# Patient Record
Sex: FEMALE | Race: WHITE | Employment: OTHER
[De-identification: names, ages, dates, MRNs, and addresses within clinical notes are randomized per-mention and may not be internally consistent; named-entity substitution may affect disease eponyms.]

---

## 2017-02-09 ENCOUNTER — TELEPHONE (OUTPATIENT)
Dept: INTERNAL MEDICINE | Facility: CLINIC | Age: 62
End: 2017-02-09

## 2017-03-16 ENCOUNTER — HOSPITAL ENCOUNTER (OUTPATIENT)
Age: 62
Setting detail: SPECIMEN
Discharge: HOME | End: 2017-03-16
Payer: COMMERCIAL

## 2017-03-16 ENCOUNTER — OFFICE VISIT (OUTPATIENT)
Dept: INTERNAL MEDICINE CLINIC | Age: 62
End: 2017-03-16
Payer: COMMERCIAL

## 2017-03-16 VITALS
WEIGHT: 165 LBS | DIASTOLIC BLOOD PRESSURE: 60 MMHG | SYSTOLIC BLOOD PRESSURE: 108 MMHG | BODY MASS INDEX: 25.9 KG/M2 | TEMPERATURE: 98.2 F | HEART RATE: 68 BPM | RESPIRATION RATE: 16 BRPM

## 2017-03-16 DIAGNOSIS — Z12.4 PAP SMEAR FOR CERVICAL CANCER SCREENING: Primary | ICD-10-CM

## 2017-03-16 DIAGNOSIS — T78.40XD ALLERGY, SUBSEQUENT ENCOUNTER: ICD-10-CM

## 2017-03-16 DIAGNOSIS — N89.8 VAGINAL DRYNESS: ICD-10-CM

## 2017-03-16 PROCEDURE — 99214 OFFICE O/P EST MOD 30 MIN: CPT | Performed by: INTERNAL MEDICINE

## 2017-03-16 RX ORDER — FLUTICASONE PROPIONATE 50 MCG
1 SPRAY, SUSPENSION (ML) NASAL DAILY
Qty: 3 BOTTLE | Refills: 1 | Status: SHIPPED | OUTPATIENT
Start: 2017-03-16 | End: 2017-11-21 | Stop reason: SDUPTHER

## 2017-03-20 LAB — CYTOLOGY REPORT: NORMAL

## 2017-09-21 ENCOUNTER — OFFICE VISIT (OUTPATIENT)
Dept: INTERNAL MEDICINE CLINIC | Age: 62
End: 2017-09-21
Payer: COMMERCIAL

## 2017-09-21 VITALS
BODY MASS INDEX: 25.43 KG/M2 | RESPIRATION RATE: 20 BRPM | HEART RATE: 80 BPM | SYSTOLIC BLOOD PRESSURE: 100 MMHG | HEIGHT: 67 IN | WEIGHT: 162 LBS | DIASTOLIC BLOOD PRESSURE: 60 MMHG | TEMPERATURE: 98.3 F

## 2017-09-21 DIAGNOSIS — E78.00 HIGH CHOLESTEROL: ICD-10-CM

## 2017-09-21 DIAGNOSIS — Z91.09 ENVIRONMENTAL ALLERGIES: Primary | ICD-10-CM

## 2017-09-21 PROCEDURE — 99213 OFFICE O/P EST LOW 20 MIN: CPT | Performed by: INTERNAL MEDICINE

## 2017-09-21 ASSESSMENT — PATIENT HEALTH QUESTIONNAIRE - PHQ9
SUM OF ALL RESPONSES TO PHQ9 QUESTIONS 1 & 2: 0
2. FEELING DOWN, DEPRESSED OR HOPELESS: 0
1. LITTLE INTEREST OR PLEASURE IN DOING THINGS: 0
SUM OF ALL RESPONSES TO PHQ QUESTIONS 1-9: 0

## 2017-11-21 RX ORDER — FLUTICASONE PROPIONATE 50 MCG
SPRAY, SUSPENSION (ML) NASAL
Qty: 1 BOTTLE | Refills: 5 | Status: SHIPPED | OUTPATIENT
Start: 2017-11-21 | End: 2020-01-15 | Stop reason: SDUPTHER

## 2018-01-03 ENCOUNTER — APPOINTMENT (OUTPATIENT)
Dept: NUCLEAR MEDICINE | Age: 63
End: 2018-01-03
Payer: COMMERCIAL

## 2018-01-03 ENCOUNTER — APPOINTMENT (OUTPATIENT)
Dept: GENERAL RADIOLOGY | Age: 63
End: 2018-01-03
Payer: COMMERCIAL

## 2018-01-03 ENCOUNTER — HOSPITAL ENCOUNTER (OUTPATIENT)
Age: 63
Setting detail: OBSERVATION
Discharge: HOME | End: 2018-01-03
Attending: EMERGENCY MEDICINE | Admitting: INTERNAL MEDICINE
Payer: COMMERCIAL

## 2018-01-03 VITALS
TEMPERATURE: 98.4 F | RESPIRATION RATE: 20 BRPM | SYSTOLIC BLOOD PRESSURE: 107 MMHG | OXYGEN SATURATION: 97 % | HEIGHT: 67 IN | HEART RATE: 75 BPM | BODY MASS INDEX: 25.11 KG/M2 | DIASTOLIC BLOOD PRESSURE: 66 MMHG | WEIGHT: 160 LBS

## 2018-01-03 DIAGNOSIS — R07.9 CHEST PAIN, UNSPECIFIED TYPE: Primary | ICD-10-CM

## 2018-01-03 LAB
ABSOLUTE EOS #: 0.4 K/UL (ref 0–0.4)
ABSOLUTE IMMATURE GRANULOCYTE: ABNORMAL K/UL (ref 0–0.3)
ABSOLUTE LYMPH #: 2.9 K/UL (ref 1–4.8)
ABSOLUTE MONO #: 0.6 K/UL (ref 0.2–0.8)
ANION GAP SERPL CALCULATED.3IONS-SCNC: 16 MMOL/L (ref 9–17)
BASOPHILS # BLD: 2 % (ref 0–2)
BASOPHILS ABSOLUTE: 0.2 K/UL (ref 0–0.2)
BUN BLDV-MCNC: 11 MG/DL (ref 8–23)
BUN/CREAT BLD: 17 (ref 9–20)
CALCIUM SERPL-MCNC: 9.3 MG/DL (ref 8.6–10.4)
CHLORIDE BLD-SCNC: 97 MMOL/L (ref 98–107)
CO2: 24 MMOL/L (ref 20–31)
CREAT SERPL-MCNC: 0.64 MG/DL (ref 0.5–0.9)
D-DIMER QUANTITATIVE: 0.47 MG/L FEU
DIFFERENTIAL TYPE: ABNORMAL
EKG ATRIAL RATE: 64 BPM
EKG ATRIAL RATE: 78 BPM
EKG P AXIS: 64 DEGREES
EKG P AXIS: 73 DEGREES
EKG P-R INTERVAL: 180 MS
EKG P-R INTERVAL: 204 MS
EKG Q-T INTERVAL: 418 MS
EKG Q-T INTERVAL: 452 MS
EKG QRS DURATION: 86 MS
EKG QRS DURATION: 88 MS
EKG QTC CALCULATION (BAZETT): 466 MS
EKG QTC CALCULATION (BAZETT): 476 MS
EKG R AXIS: -5 DEGREES
EKG R AXIS: -9 DEGREES
EKG T AXIS: 52 DEGREES
EKG T AXIS: 69 DEGREES
EKG VENTRICULAR RATE: 64 BPM
EKG VENTRICULAR RATE: 78 BPM
EOSINOPHILS RELATIVE PERCENT: 5 % (ref 1–4)
GFR AFRICAN AMERICAN: >60 ML/MIN
GFR NON-AFRICAN AMERICAN: >60 ML/MIN
GFR SERPL CREATININE-BSD FRML MDRD: ABNORMAL ML/MIN/{1.73_M2}
GFR SERPL CREATININE-BSD FRML MDRD: ABNORMAL ML/MIN/{1.73_M2}
GLUCOSE BLD-MCNC: 102 MG/DL (ref 70–99)
HCT VFR BLD CALC: 43.3 % (ref 36–46)
HEMOGLOBIN: 13.9 G/DL (ref 12–16)
IMMATURE GRANULOCYTES: ABNORMAL %
INR BLD: 0.9
LV EF: 77 %
LVEF MODALITY: NORMAL
LYMPHOCYTES # BLD: 37 % (ref 24–44)
MCH RBC QN AUTO: 30.3 PG (ref 26–34)
MCHC RBC AUTO-ENTMCNC: 32.2 G/DL (ref 31–37)
MCV RBC AUTO: 94.3 FL (ref 80–100)
MONOCYTES # BLD: 7 % (ref 1–7)
MYOGLOBIN: 22 NG/ML (ref 25–58)
MYOGLOBIN: <21 NG/ML (ref 25–58)
PARTIAL THROMBOPLASTIN TIME: 25.4 SEC (ref 23–31)
PDW BLD-RTO: 12.9 % (ref 11.5–14.5)
PLATELET # BLD: 332 K/UL (ref 130–400)
PLATELET ESTIMATE: ABNORMAL
PMV BLD AUTO: ABNORMAL FL (ref 6–12)
POTASSIUM SERPL-SCNC: 3.8 MMOL/L (ref 3.7–5.3)
PROTHROMBIN TIME: 9.7 SEC (ref 9.7–11.6)
RBC # BLD: 4.59 M/UL (ref 4–5.2)
RBC # BLD: ABNORMAL 10*6/UL
SEG NEUTROPHILS: 49 % (ref 36–66)
SEGMENTED NEUTROPHILS ABSOLUTE COUNT: 3.8 K/UL (ref 1.8–7.7)
SODIUM BLD-SCNC: 137 MMOL/L (ref 135–144)
TROPONIN INTERP: ABNORMAL
TROPONIN T: <0.03 NG/ML
WBC # BLD: 7.9 K/UL (ref 3.5–11)
WBC # BLD: ABNORMAL 10*3/UL

## 2018-01-03 PROCEDURE — A9500 TC99M SESTAMIBI: HCPCS | Performed by: EMERGENCY MEDICINE

## 2018-01-03 PROCEDURE — 6370000000 HC RX 637 (ALT 250 FOR IP): Performed by: EMERGENCY MEDICINE

## 2018-01-03 PROCEDURE — 71045 X-RAY EXAM CHEST 1 VIEW: CPT

## 2018-01-03 PROCEDURE — G0378 HOSPITAL OBSERVATION PER HR: HCPCS

## 2018-01-03 PROCEDURE — 93005 ELECTROCARDIOGRAM TRACING: CPT

## 2018-01-03 PROCEDURE — 3430000000 HC RX DIAGNOSTIC RADIOPHARMACEUTICAL: Performed by: EMERGENCY MEDICINE

## 2018-01-03 PROCEDURE — 84484 ASSAY OF TROPONIN QUANT: CPT

## 2018-01-03 PROCEDURE — 78452 HT MUSCLE IMAGE SPECT MULT: CPT

## 2018-01-03 PROCEDURE — 93017 CV STRESS TEST TRACING ONLY: CPT

## 2018-01-03 PROCEDURE — 81003 URINALYSIS AUTO W/O SCOPE: CPT

## 2018-01-03 PROCEDURE — 85610 PROTHROMBIN TIME: CPT

## 2018-01-03 PROCEDURE — 36415 COLL VENOUS BLD VENIPUNCTURE: CPT

## 2018-01-03 PROCEDURE — 85025 COMPLETE CBC W/AUTO DIFF WBC: CPT

## 2018-01-03 PROCEDURE — 85730 THROMBOPLASTIN TIME PARTIAL: CPT

## 2018-01-03 PROCEDURE — 85379 FIBRIN DEGRADATION QUANT: CPT

## 2018-01-03 PROCEDURE — 99223 1ST HOSP IP/OBS HIGH 75: CPT | Performed by: INTERNAL MEDICINE

## 2018-01-03 PROCEDURE — 83874 ASSAY OF MYOGLOBIN: CPT

## 2018-01-03 PROCEDURE — 99285 EMERGENCY DEPT VISIT HI MDM: CPT

## 2018-01-03 PROCEDURE — 80048 BASIC METABOLIC PNL TOTAL CA: CPT

## 2018-01-03 RX ORDER — SODIUM CHLORIDE 0.9 % (FLUSH) 0.9 %
10 SYRINGE (ML) INJECTION PRN
Status: CANCELLED | OUTPATIENT
Start: 2018-01-03 | End: 2018-01-04

## 2018-01-03 RX ORDER — NITROGLYCERIN 0.4 MG/1
0.4 TABLET SUBLINGUAL EVERY 5 MIN PRN
Status: CANCELLED | OUTPATIENT
Start: 2018-01-03 | End: 2018-01-04

## 2018-01-03 RX ORDER — 0.9 % SODIUM CHLORIDE 0.9 %
250 INTRAVENOUS SOLUTION INTRAVENOUS ONCE
Status: DISCONTINUED | OUTPATIENT
Start: 2018-01-03 | End: 2018-01-03 | Stop reason: HOSPADM

## 2018-01-03 RX ORDER — SODIUM CHLORIDE 0.9 % (FLUSH) 0.9 %
10 SYRINGE (ML) INJECTION PRN
Status: DISCONTINUED | OUTPATIENT
Start: 2018-01-03 | End: 2018-01-03 | Stop reason: HOSPADM

## 2018-01-03 RX ORDER — METOPROLOL TARTRATE 5 MG/5ML
2.5 INJECTION INTRAVENOUS PRN
Status: DISCONTINUED | OUTPATIENT
Start: 2018-01-03 | End: 2018-01-03 | Stop reason: HOSPADM

## 2018-01-03 RX ORDER — NITROGLYCERIN 0.4 MG/1
0.4 TABLET SUBLINGUAL EVERY 5 MIN PRN
Status: DISCONTINUED | OUTPATIENT
Start: 2018-01-03 | End: 2018-01-03 | Stop reason: HOSPADM

## 2018-01-03 RX ORDER — SODIUM CHLORIDE 0.9 % (FLUSH) 0.9 %
10 SYRINGE (ML) INJECTION EVERY 12 HOURS SCHEDULED
Status: DISCONTINUED | OUTPATIENT
Start: 2018-01-03 | End: 2018-01-03 | Stop reason: HOSPADM

## 2018-01-03 RX ORDER — 0.9 % SODIUM CHLORIDE 0.9 %
250 INTRAVENOUS SOLUTION INTRAVENOUS ONCE
Status: CANCELLED | OUTPATIENT
Start: 2018-01-03 | End: 2018-01-03

## 2018-01-03 RX ORDER — METOPROLOL TARTRATE 5 MG/5ML
2.5 INJECTION INTRAVENOUS PRN
Status: CANCELLED | OUTPATIENT
Start: 2018-01-03 | End: 2018-01-04

## 2018-01-03 RX ORDER — AMINOPHYLLINE DIHYDRATE 25 MG/ML
100 INJECTION, SOLUTION INTRAVENOUS
Status: DISCONTINUED | OUTPATIENT
Start: 2018-01-03 | End: 2018-01-03 | Stop reason: HOSPADM

## 2018-01-03 RX ORDER — ASPIRIN 81 MG/1
324 TABLET, CHEWABLE ORAL ONCE
Status: COMPLETED | OUTPATIENT
Start: 2018-01-03 | End: 2018-01-03

## 2018-01-03 RX ADMIN — TETRAKIS(2-METHOXYISOBUTYLISOCYANIDE)COPPER(I) TETRAFLUOROBORATE 18.4 MILLICURIE: 1 INJECTION, POWDER, LYOPHILIZED, FOR SOLUTION INTRAVENOUS at 09:10

## 2018-01-03 RX ADMIN — ASPIRIN 81 MG 324 MG: 81 TABLET ORAL at 01:30

## 2018-01-03 RX ADMIN — TETRAKIS(2-METHOXYISOBUTYLISOCYANIDE)COPPER(I) TETRAFLUOROBORATE 40.4 MILLICURIE: 1 INJECTION, POWDER, LYOPHILIZED, FOR SOLUTION INTRAVENOUS at 12:55

## 2018-01-03 ASSESSMENT — PAIN SCALES - GENERAL
PAINLEVEL_OUTOF10: 2
PAINLEVEL_OUTOF10: 3
PAINLEVEL_OUTOF10: 0

## 2018-01-03 ASSESSMENT — ENCOUNTER SYMPTOMS
FACIAL SWELLING: 0
EYE DISCHARGE: 0
DIARRHEA: 0
EYE REDNESS: 0
ABDOMINAL PAIN: 0
COLOR CHANGE: 0
VOMITING: 0
SHORTNESS OF BREATH: 0
CONSTIPATION: 0
COUGH: 0

## 2018-01-03 ASSESSMENT — PAIN DESCRIPTION - LOCATION: LOCATION: CHEST

## 2018-01-03 NOTE — H&P
[id number] [facility] [address]                 [address] [place] [address]                               HISTORY AND PHYSICAL    PATIENT NAME: Ahsan Monson                 :        1955  MED REC NO:   [id number]                             ROOM:       9112  ACCOUNT NO:   [de-identified]                           ADMIT DATE: 2018  PROVIDER:     Ignacio Hilliard    CHIEF COMPLAINT:  Chest pain, back pain. HISTORY OF PRESENT ILLNESS:  This is a 46-year-old white female who  regularly exercises and is in good health, presented to the emergency room  with chest pain. The patient states that when she was at home, sitting at  around 10:30 p.m., started having sudden back pain and then started having  some chest tightness and pain. The patient's pain did not improve and so  presented to the emergency room and nitro was given and patient's symptoms  slightly improve. The patient was admitted for further evaluation and to  rule out coronary artery disease. The patient is currently stable. Denies  any chest pain. The patient had first part of the stress test done without  any significant difficulty. PAST MEDICAL HISTORY:  Positive for asthma, irritable bowel syndrome, panic  disorder, and allergies. DRUG ALLERGIES:  The patient is allergic to PENICILLIN and SULFA. MEDICATIONS:  The patient is on Zyrtec, Flonase, and QVAR. SOCIAL HISTORY:  The patient does not smoke or drink alcohol. FAMILY HISTORY:  Noncontributory. REVIEW OF SYSTEMS:  Denies any chest pain at this time, but had chest pain  and tightness and back pain. The patient denies any shortness of breath,  cough, or wheezing. No flu symptoms. No fever, chills, nausea, vomiting,  diarrhea, or constipation. No blurred vision or double vision. All other  review of systems was negative.     PHYSICAL EXAMINATION:  GENERAL:  A 46-year-old white female lying in bed, not in any acute  distress. VITAL SIGNS:  Stable. Afebrile. HEENT:  Atraumatic, normocephalic. Throat clear. NECK:  No bruits, no JVD. HEART:  S1, S2 heard of normal intensity. LUNGS:  Clear to auscultation and percussion. Unlabored breathing. ABDOMEN:  Soft, nontender. Bowel sounds are heard. EXTREMITIES:  No edema. No calf tenderness. ASSESSMENT:  Chest pain, back pain, history of asthma, history of  allergies, and history of panic attacks. PLAN:  Continue current treatment. Cardiology evaluation, stress test to  rule out coronary artery disease, supportive care, and D-dimer ordered. Activity as tolerated if stress test is negative. The patient will be  discharged to home today. Discussed with the patient and staff.         Betty Pagan    D: 01/03/2018 12:04:58       T: 01/03/2018 13:38:26     [email or web address]  Job#: [id number]     Doc#: [id number]    CC:

## 2018-01-03 NOTE — ED NOTES
Report called to PCU pt to go to stress test then to 1002 POC discussed with pt.      Mabeline Lefort, RN  01/03/18 0579

## 2018-01-03 NOTE — CONSULTS
Cardiovascular Consult Note     TODAY'S DATE: 1/3/2018    Patient name: Brian Alexandre   YOB: 1955  Date of admission:  1/3/2018       Patient seen, examined. Previous clinical entries reviewed. All available laboratory, imaging and ancillary data reviewed. Reason for Consult:  Chest pain. Referring Physician: Dr. Angela Johnson MD.    History of present Illness:     Brian Alexandre is a 65 y.o. female with past medical history significant for asthma, panic disorder and irritable bowel syndrome who presented to the emergency room with complaints of chest pain that started the middle of the chest while she was watching television. She had some diaphoresis. It was a mild to moderate intensity chest discomfort with some associated back pain. Her symptoms improved. Her baseline electrocardiogram showed no acute changes. Her cardiac enzymes were negative. She does have some back pain. She is no other significant cardiac risk factors including tobacco abuse, hypertension, diabetes or hyperlipidemia. Past Medical History:    has a past medical history of Asthma; IBS (irritable bowel syndrome); and Panic disorder.     Surgical History:     Past Surgical History:   Procedure Laterality Date    CYST INCISION AND DRAINAGE Right 11/1997    NASAL POLYP SURGERY         Medications:   Scheduled Meds:   sodium chloride flush  10 mL Intravenous 2 times per day    0.9 % sodium chloride  250 mL Intravenous Once     Continuous Infusions:    Outpatient Prescriptions Marked as Taking for the 1/3/18 encounter [facility] Encounter)   Medication Sig Dispense Refill    fluticasone (FLONASE) 50 MCG/ACT nasal spray USE 1 SPRAY NASALLY DAILY 1 Bottle 5    QVAR 80 MCG/ACT inhaler INHALE 1 PUFF ORALLY TWICE DAILY (NEED TO SEE DR BEFORE MORE REFILLS) 80 Inhaler 0    Cetirizine HCl (ZYRTEC ALLERGY PO) Take 1 tablet by mouth daily         Allergies:   Pcn [penicillins] and Sulfa antibiotics    Social History:    reports that she has never smoked. She has never used smokeless tobacco. She reports that she drinks about 4.2 oz of alcohol per week . She reports that she does not use drugs. Family History:    family history includes Cancer in her father; Heart Disease in her maternal grandfather; High Blood Pressure in her father and mother. Review of Systems:     Constitutional: No fever/chills. HENT: No headache, neck pain or neck stiffnes. No sore throat or dysphagia. Eyes: No blurred vision. Respiratory: As above. Cardiovascular: As above. Gastrointestinal: Negative. Genitourinary: Negative  Endocrine: Negative. Musculoskeletal: Negative. Skin: Negative. Allergic/Immunologic: Negative. Neurologic: Negative. Hematological: Negative. Psychiatric: Negative. All other systems are are noted to be otherwise negative. Physical Exam:   /63   Pulse 66   Temp 97.7 °F (36.5 °C) (Oral)   Resp 14   Ht 5' 7\" (1.702 m)   Wt 160 lb (72.6 kg)   LMP 01/01/2001   SpO2 98%   BMI 25.06 kg/m²   No intake or output data in the 24 hours ending 01/03/18 1413    GENERAL:  Alert, appropriate, oriented, in NAD. HEENT:  Head is atraumatic and normocephalic. No Pallor. No icterus. NECK: Supple without any thyromegaly. LUNGS: Generally clear to auscultation  CARDIAC: S1, S2, RRR. ABD:  Soft non-tender . EXT: No edema. MS: No obvious deformities. SKIN: No obvious skin rashes. NEURO: No focal neurologic deficits    Labs/ Ancillary data:     CBC:   Recent Labs      01/03/18   0120   WBC  7.9   HGB  13.9   PLT  332     BMP:    Recent Labs      01/03/18   0120   NA  137   K  3.8   CL  97*   CO2  24   BUN  11   CREATININE  0.64   GLUCOSE  102*     Hepatic: No results for input(s): AST, ALT, ALB, BILITOT, ALKPHOS in the last 72 hours. Troponin:   Recent Labs      01/03/18   1120   TROPONINT  <0.03     BNP: Invalid input(s): LASTBNP  Lipids: No results for input(s): CHOL, HDL in the last 72 hours.     Invalid

## 2018-01-03 NOTE — PROGRESS NOTES
Dr. Hero Montenegro did discharge and pt informed. Discharge instructs explained and IV taken out. Pt denied any questions. Pt calling for ride home.

## 2018-01-03 NOTE — ED NOTES
NPO for stress test POC discussed with pt magaly CP at this time     Ronnell Villalobos, CRISTIAN  01/03/18 1301

## 2018-01-03 NOTE — ED PROVIDER NOTES
[address] [place] [address] ED  eMERGENCY dEPARTMENT eNCOUnter      Pt Name: Ambrose Chaudhry  MRN: [id number]  [place] 1955  Date of evaluation: 1/3/2018  Provider: Marcella Barragan MD    [address] [place] [address]       Chief Complaint   Patient presents with    Chest Pain    Back Pain         HISTORY OF PRESENT ILLNESS  (Location/Symptom, Timing/Onset, Context/Setting, Quality, Duration, Modifying Factors, Severity.)   Ambrose Chaudhry is a 65 y.o. female who presents to the emergency department For chest pain. It was in the middle part of her chest and was coming going. It would last for a few minutes at a time and it started about 3 hours ago when she was at home sitting on the couch watching television. Her palms got sweaty but she did not otherwise feel sweaty and the pain was localized to the central part of her chest, sternal area. It did not go to her arms but once she felt it up into her neck. She does not have the pain now. She rated the pain as a 3. Nursing Notes were reviewed. ALLERGIES     Pcn [penicillins] and Sulfa antibiotics    CURRENT MEDICATIONS       Previous Medications    CETIRIZINE HCL (ZYRTEC ALLERGY PO)    Take 1 tablet by mouth daily    FLUTICASONE (FLONASE) 50 MCG/ACT NASAL SPRAY    USE 1 SPRAY NASALLY DAILY    QVAR 80 MCG/ACT INHALER    INHALE 1 PUFF ORALLY TWICE DAILY (NEED TO SEE DR BEFORE MORE REFILLS)       PAST MEDICAL HISTORY         Diagnosis Date    IBS (irritable bowel syndrome)     Panic disorder        SURGICAL HISTORY           Procedure Laterality Date    CYST INCISION AND DRAINAGE Right 11/1997         FAMILY HISTORY           Problem Relation Age of Onset    High Blood Pressure Mother     High Blood Pressure Father     Cancer Father      testicular    Heart Disease Maternal Grandfather      Family Status   Relation Status    Mother     Father     Maternal Grandfather         SOCIAL HISTORY      reports that she has never smoked.  She has never used smokeless alert and oriented to person, place, and time. Skin: Skin is warm and dry. No rash noted. She is not diaphoretic. No erythema. Psychiatric: She has a normal mood and affect. Her behavior is normal.   Vitals reviewed. DIAGNOSTIC RESULTS     EKG: All EKG's are interpreted by the Emergency Department Physician who either signs or Co-signs this chart in the absence of a cardiologist.    EKG on my interpretation showed no acute findings    RADIOLOGY:   Non-plain film images such as CT, Ultrasound and MRI are read by the radiologist. Plain radiographic images are visualized and preliminarily interpreted by the emergency physician with the below findings:    Interpretation per the Radiologist below, if available at the time of this note:    Xr Chest Portable    Result Date: 1/3/2018  EXAMINATION: SINGLE VIEW OF THE CHEST 1/3/2018 1:25 am COMPARISON: None. HISTORY: ORDERING SYSTEM PROVIDED HISTORY: Chest Pain TECHNOLOGIST PROVIDED HISTORY: Reason for exam:->Chest Pain Ordering Physician Provided Reason for Exam: chest pain Acuity: Acute Type of Exam: Initial FINDINGS: The cardiac silhouette is within normal limits for size. The pulmonary vasculature is within normal limits. There is no focal consolidation, pleural effusion or pneumothorax. The visualized osseous structures demonstrate no acute abnormality. No acute cardiopulmonary abnormality. LABS:  Labs Reviewed   BASIC METABOLIC PANEL - Abnormal; Notable for the following:        Result Value    Glucose 102 (*)     Chloride 97 (*)     All other components within normal limits   TROP/MYOGLOBIN - Abnormal; Notable for the following:     Myoglobin 22 (*)     All other components within normal limits   PROTIME-INR   APTT   CBC WITH AUTO DIFFERENTIAL   TROP/MYOGLOBIN   TROP/MYOGLOBIN   TROP/MYOGLOBIN       All other labs were within normal range or not returned as of this dictation.     EMERGENCY DEPARTMENT COURSE and DIFFERENTIAL DIAGNOSIS/MDM:   Vitals:

## 2018-01-04 NOTE — FLOWSHEET NOTE
visited patient and her . Patient came in having chest pain. Patient stated that she was doing better and going home today. Patient expressed no other needs at this time. Patient was receptive and grateful for my visit.  provided ministry of presence. 01/03/18 1954   Encounter Summary   Services provided to: Patient   Referral/Consult From: Interventional Spine   Support System Spouse   Continue Visiting (1/3/18)   Complexity of Encounter Low   Length of Encounter 15 minutes   Spiritual Assessment Completed Yes   Routine   Type Initial   Assessment Approachable   Intervention Active listening;Explored feelings, thoughts, concerns;Sustaining presence/ Ministry of presence; Discussed illness/injury and it's impact   Outcome Expressed gratitude;Engaged in conversation;Receptive

## 2018-02-22 ENCOUNTER — OFFICE VISIT (OUTPATIENT)
Dept: INTERNAL MEDICINE CLINIC | Age: 63
End: 2018-02-22
Payer: COMMERCIAL

## 2018-02-22 VITALS
WEIGHT: 167 LBS | DIASTOLIC BLOOD PRESSURE: 68 MMHG | HEIGHT: 67 IN | OXYGEN SATURATION: 94 % | RESPIRATION RATE: 15 BRPM | HEART RATE: 74 BPM | SYSTOLIC BLOOD PRESSURE: 110 MMHG | TEMPERATURE: 98.6 F | BODY MASS INDEX: 26.21 KG/M2

## 2018-02-22 DIAGNOSIS — E78.00 HIGH CHOLESTEROL: ICD-10-CM

## 2018-02-22 DIAGNOSIS — R07.9 CHEST PAIN, UNSPECIFIED TYPE: ICD-10-CM

## 2018-02-22 DIAGNOSIS — R05.9 COUGH: ICD-10-CM

## 2018-02-22 DIAGNOSIS — Z91.09 ENVIRONMENTAL ALLERGIES: ICD-10-CM

## 2018-02-22 DIAGNOSIS — M79.671 RIGHT FOOT PAIN: Primary | ICD-10-CM

## 2018-02-22 LAB
INFLUENZA A ANTIBODY: NORMAL
INFLUENZA B ANTIBODY: NORMAL

## 2018-02-22 PROCEDURE — 87804 INFLUENZA ASSAY W/OPTIC: CPT | Performed by: INTERNAL MEDICINE

## 2018-02-22 PROCEDURE — 99214 OFFICE O/P EST MOD 30 MIN: CPT | Performed by: INTERNAL MEDICINE

## 2018-02-22 NOTE — PROGRESS NOTES
also will get a copy of the report from the foot doctor Dr. Raymon Dwyer and patient had good pedal pulses bilaterally  Patient's cholesterol was elevated last time and patient states that she had taken the blood test which was ordered in September but I never got the copy of the report will call the lab called for the last lab copy  We did a flu screening as patient was complaining of some body aches but was negative  Review in 6 months           1. [facility] received counseling on the following healthy behaviors: diet and exercise      2. Prior labs and health maintenance reviewed. 3.  Discussed use, benefit, and side effects of prescribed medications. Barriers to medication compliance addressed. All her questions were answered. Pt voiced understanding. [facility] will continue current medications, diet and exercise. No orders of the defined types were placed in this encounter. Completed Refills               Requested Prescriptions      No prescriptions requested or ordered in this encounter     4. Patient given educational materials - see patient instructions    5. Was a self-tracking handout given in paper form or via [employer]? NO    No orders of the defined types were placed in this encounter. Return in about 6 months (around 8/22/2018). Patient voiced understanding and agreed to treatment plan. Electronically signed by Jyotsna Swanson MD on 2/22/2018 at 12:04 PM    This note is created with a voice recognition program and while intend to generate a document that accurately reflects the content of the visit, no guarantee can be provided that every mistake has been identified and corrected by editing.

## 2018-03-20 ENCOUNTER — TELEPHONE (OUTPATIENT)
Dept: INTERNAL MEDICINE CLINIC | Age: 63
End: 2018-03-20

## 2018-03-20 RX ORDER — ALPRAZOLAM 0.25 MG/1
0.25 TABLET ORAL 3 TIMES DAILY PRN
Qty: 30 TABLET | Refills: 0 | OUTPATIENT
Start: 2018-03-20 | End: 2018-08-28 | Stop reason: SDUPTHER

## 2018-03-20 NOTE — TELEPHONE ENCOUNTER
Pt given all instructions  Phoned med to [facility] as requested

## 2018-03-20 NOTE — TELEPHONE ENCOUNTER
patient of Dr Chalino Velez calling to see if she can get a medication for anxiety   states she recently loss her mother unexpected and now she has to fly out tomorrow.  Please advise

## 2018-05-22 ENCOUNTER — TELEPHONE (OUTPATIENT)
Dept: INTERNAL MEDICINE CLINIC | Age: 63
End: 2018-05-22

## 2018-08-28 ENCOUNTER — OFFICE VISIT (OUTPATIENT)
Dept: INTERNAL MEDICINE CLINIC | Age: 63
End: 2018-08-28
Payer: COMMERCIAL

## 2018-08-28 VITALS
RESPIRATION RATE: 16 BRPM | WEIGHT: 165.4 LBS | TEMPERATURE: 98.2 F | HEIGHT: 67 IN | HEART RATE: 64 BPM | SYSTOLIC BLOOD PRESSURE: 106 MMHG | BODY MASS INDEX: 25.96 KG/M2 | DIASTOLIC BLOOD PRESSURE: 64 MMHG

## 2018-08-28 DIAGNOSIS — J45.20 MILD INTERMITTENT ASTHMA WITHOUT COMPLICATION: ICD-10-CM

## 2018-08-28 DIAGNOSIS — F41.9 ANXIETY: ICD-10-CM

## 2018-08-28 DIAGNOSIS — Z91.09 ENVIRONMENTAL ALLERGIES: Primary | ICD-10-CM

## 2018-08-28 PROCEDURE — 99214 OFFICE O/P EST MOD 30 MIN: CPT | Performed by: INTERNAL MEDICINE

## 2018-08-28 RX ORDER — ALPRAZOLAM 0.25 MG/1
0.25 TABLET ORAL NIGHTLY PRN
Qty: 10 TABLET | Refills: 0 | Status: SHIPPED | OUTPATIENT
Start: 2018-08-28 | End: 2018-09-07

## 2018-08-28 ASSESSMENT — PATIENT HEALTH QUESTIONNAIRE - PHQ9
1. LITTLE INTEREST OR PLEASURE IN DOING THINGS: 0
SUM OF ALL RESPONSES TO PHQ QUESTIONS 1-9: 0
2. FEELING DOWN, DEPRESSED OR HOPELESS: 0
SUM OF ALL RESPONSES TO PHQ9 QUESTIONS 1 & 2: 0
SUM OF ALL RESPONSES TO PHQ QUESTIONS 1-9: 0

## 2018-08-28 NOTE — PROGRESS NOTES
Hugo Smith is a 59 y.o. female who presents for   Chief Complaint   Patient presents with   [facility] Other     doing well with allergies- spring and fall tend to be worse per Pt. . No recent labs    Other     will be flying again and asks if she can have some xanax again,    Immunizations     discussed with pneumonia, she is travelling and will do next appt    and follow up of chronic medical problems. Patient Active Problem List   Diagnosis    IBS (irritable bowel syndrome)    Panic disorder    Chest pain     HPI  Here for follow-up and allergies and asthma denies any new complaints and wants Xanax for traveling    Current Outpatient Prescriptions   Medication Sig Dispense Refill    ALPRAZolam (XANAX) 0.25 MG tablet Take 1 tablet by mouth nightly as needed for Anxiety for up to 10 days. . 10 tablet 0    QVAR 80 MCG/ACT inhaler INHALE 1 PUFF ORALLY TWICE DAILY 16 Inhaler 3    fluticasone (FLONASE) 50 MCG/ACT nasal spray USE 1 SPRAY NASALLY DAILY 1 Bottle 5    Cetirizine HCl (ZYRTEC ALLERGY PO) Take 1 tablet by mouth daily       No current facility-administered medications for this visit.         Allergies   Allergen Reactions    Pcn [Penicillins]     Sulfa Antibiotics        Past Medical History:   Diagnosis Date    Asthma     IBS (irritable bowel syndrome)     Panic disorder        Past Surgical History:   Procedure Laterality Date    CYST INCISION AND DRAINAGE Right 11/1997    NASAL POLYP SURGERY         Family History   Problem Relation Age of Onset    High Blood Pressure Mother     High Blood Pressure Father     Cancer Father         melanoma    Heart Disease Maternal Grandfather      ROS   Constitutional:  Negative for fatigue, loss of appetite and unexpected weight change   HEENT            : Negative for neck stiffness and pain, no congestion or sinus pressure   Eyes                : No visual disturbance or pain   Cardiovascular: No chest pain or palpitations or leg swelling   Respiratory      : Negative for cough, shortness of breath or wheezing   Gastrointestinal: Negative for abdominal pain, constipation or diarrhea and bloating No nausea or vomiting   Genitourinary:     No urgency or frequency, no burning or hematuria   Musculoskeletal: No arthralgias, back pain or myalgias   Skin                  : Negative for rash or erythema   Neurological    : Negative for dizziness, weakness, tremors ,light headedness or syncope   Psychiatric       : Negative for dysphoric mood, sleep disturbances, nervous or anxious, or decreased concentration   All other review of systems was negative    Objective  Physical Examination:    Nursing note reviewed    /64 (Site: Left Arm, Position: Sitting, Cuff Size: Medium Adult)   Pulse 64   Temp 98.2 °F (36.8 °C) (Oral)   Resp 16   Ht 5' 7\" (1.702 m)   Wt 165 lb 6.4 oz (75 kg)   LMP 01/01/2001 (LMP Unknown)   BMI 25.91 kg/m²   BP Readings from Last 3 Encounters:   08/28/18 106/64   02/22/18 110/68   01/03/18 107/66         Constitutional:  I[place]ia Claire is oriented to place, person and time ,appears well-developed and well-nourished  HEENT:  Atraumatic and normocephalic, external ears normal bilaterally, nose normal no oropharyngeal exudate and is clear and moist  Eyes:  EOCM normal; conjunctivae normal; PERRLA bilaterally  Neck:  Normal range of motion, neck supple, no JVD and no thyromegaly  Cardiovascular:  RRR, normal heart sounds and intact distal pulses  Pulmonary:  effort normal and breath sounds normal bilaterally,no wheezes or rales, no respiratory distress  Abdominal:  Soft, non-tender; normal bowel sounds, no masses  Musculoskeletal:  Normal range of motion and no edema or tenderness bilaterally  No lymphadenopathy  Neurological:  alert, oriented, and normal reflexes bilaterally  Skin: warm and dry  Psychiatric:  normal mood and effect; behavior normal.    Labs:   No results found for: LABA1C  Lab Results   Component Value

## 2018-10-04 ENCOUNTER — HOSPITAL ENCOUNTER (OUTPATIENT)
Dept: MAMMOGRAPHY | Age: 63
Discharge: HOME | End: 2018-10-06
Payer: COMMERCIAL

## 2018-10-04 DIAGNOSIS — Z12.31 VISIT FOR SCREENING MAMMOGRAM: ICD-10-CM

## 2018-10-04 PROCEDURE — 77063 BREAST TOMOSYNTHESIS BI: CPT

## 2018-10-29 ENCOUNTER — TELEPHONE (OUTPATIENT)
Dept: INTERNAL MEDICINE CLINIC | Age: 63
End: 2018-10-29

## 2018-10-29 NOTE — TELEPHONE ENCOUNTER
Patient called requesting a full panel of blood work ordered, next appt is 11/2/18 with a Nurse/MA, please advise --

## 2018-10-31 ENCOUNTER — NURSE ONLY (OUTPATIENT)
Dept: INTERNAL MEDICINE CLINIC | Age: 63
End: 2018-10-31
Payer: COMMERCIAL

## 2018-10-31 ENCOUNTER — TELEPHONE (OUTPATIENT)
Dept: INTERNAL MEDICINE CLINIC | Age: 63
End: 2018-10-31

## 2018-10-31 DIAGNOSIS — Z23 NEED FOR INFLUENZA VACCINATION: ICD-10-CM

## 2018-10-31 DIAGNOSIS — Z23 NEED FOR PNEUMOCOCCAL VACCINATION: Primary | ICD-10-CM

## 2018-10-31 PROCEDURE — 90471 IMMUNIZATION ADMIN: CPT | Performed by: INTERNAL MEDICINE

## 2018-10-31 PROCEDURE — 90472 IMMUNIZATION ADMIN EACH ADD: CPT | Performed by: INTERNAL MEDICINE

## 2018-10-31 PROCEDURE — 90670 PCV13 VACCINE IM: CPT | Performed by: INTERNAL MEDICINE

## 2018-10-31 PROCEDURE — 90688 IIV4 VACCINE SPLT 0.5 ML IM: CPT | Performed by: INTERNAL MEDICINE

## 2018-10-31 NOTE — TELEPHONE ENCOUNTER
I think we gave her a prescription for the lab work when she was here in August which was a re print from the previous visit  There is no definitive blood test for ovarian cancer even though  can help to some extent but may not be covered by the insurance and in the same day ultrasound of the pelvis which is a better test to evaluate for ovaries may not be covered but can be ordered and I would advise patient to get an ultrasound of the pelvis to evaluate for ovaries

## 2018-11-01 NOTE — PROGRESS NOTES
Vaccine Information Sheet, \"Influenza - Inactivated\"  given to Esther Brown, or parent/legal guardian of  Esther Brown and verbalized understanding. Patient responses:    Have you ever had a reaction to a flu vaccine? No  Are you able to eat eggs without adverse effects? Yes  Do you have any current illness? No  Have you ever had [place] Syndrome? No    Flu vaccine given per order. Please see immunization tab.

## 2018-11-05 ENCOUNTER — OFFICE VISIT (OUTPATIENT)
Dept: INTERNAL MEDICINE CLINIC | Age: 63
End: 2018-11-05
Payer: COMMERCIAL

## 2018-11-05 VITALS
OXYGEN SATURATION: 96 % | DIASTOLIC BLOOD PRESSURE: 72 MMHG | HEART RATE: 68 BPM | TEMPERATURE: 97.6 F | WEIGHT: 161 LBS | BODY MASS INDEX: 25.27 KG/M2 | RESPIRATION RATE: 14 BRPM | HEIGHT: 67 IN | SYSTOLIC BLOOD PRESSURE: 113 MMHG

## 2018-11-05 DIAGNOSIS — R10.9 RIGHT SIDED ABDOMINAL PAIN: Primary | ICD-10-CM

## 2018-11-05 PROCEDURE — 99213 OFFICE O/P EST LOW 20 MIN: CPT | Performed by: INTERNAL MEDICINE

## 2018-11-06 LAB
ALBUMIN SERPL-MCNC: 4.3 G/DL
ALP BLD-CCNC: 74 U/L
ALT SERPL-CCNC: 18 U/L
ANION GAP SERPL CALCULATED.3IONS-SCNC: 1.7 MMOL/L
AST SERPL-CCNC: 18 U/L
BASOPHILS ABSOLUTE: NORMAL /ΜL
BASOPHILS RELATIVE PERCENT: NORMAL %
BILIRUB SERPL-MCNC: 0.2 MG/DL (ref 0.1–1.4)
BILIRUBIN, URINE: NORMAL
BLOOD, URINE: NORMAL
BUN BLDV-MCNC: 12 MG/DL
CA 125: NORMAL
CALCIUM SERPL-MCNC: 8.8 MG/DL
CHLORIDE BLD-SCNC: 104 MMOL/L
CLARITY: NORMAL
CO2: 22 MMOL/L
COLOR: NORMAL
CREAT SERPL-MCNC: 0.55 MG/DL
EOSINOPHILS ABSOLUTE: NORMAL /ΜL
EOSINOPHILS RELATIVE PERCENT: NORMAL %
GFR CALCULATED: 101
GLUCOSE BLD-MCNC: 121 MG/DL
GLUCOSE URINE: NORMAL
HCT VFR BLD CALC: NORMAL % (ref 36–46)
HEMOGLOBIN: NORMAL G/DL (ref 12–16)
KETONES, URINE: NORMAL
LEUKOCYTE ESTERASE, URINE: NORMAL
LYMPHOCYTES ABSOLUTE: NORMAL /ΜL
LYMPHOCYTES RELATIVE PERCENT: NORMAL %
MCH RBC QN AUTO: NORMAL PG
MCHC RBC AUTO-ENTMCNC: NORMAL G/DL
MCV RBC AUTO: NORMAL FL
MONOCYTES ABSOLUTE: NORMAL /ΜL
MONOCYTES RELATIVE PERCENT: NORMAL %
NEUTROPHILS ABSOLUTE: NORMAL /ΜL
NEUTROPHILS RELATIVE PERCENT: NORMAL %
NITRITE, URINE: NORMAL
PH UA: NORMAL (ref 4.5–8)
PLATELET # BLD: NORMAL K/ΜL
PMV BLD AUTO: NORMAL FL
POTASSIUM SERPL-SCNC: 4.3 MMOL/L
PROTEIN UA: NORMAL
RBC # BLD: NORMAL 10^6/ΜL
SODIUM BLD-SCNC: 141 MMOL/L
SPECIFIC GRAVITY, URINE: NORMAL
TOTAL PROTEIN: 6.9
UROBILINOGEN, URINE: NORMAL
WBC # BLD: NORMAL 10^3/ML

## 2018-11-08 DIAGNOSIS — R10.9 RIGHT SIDED ABDOMINAL PAIN: ICD-10-CM

## 2018-11-15 ENCOUNTER — TELEPHONE (OUTPATIENT)
Dept: INTERNAL MEDICINE CLINIC | Age: 63
End: 2018-11-15

## 2018-11-15 ENCOUNTER — HOSPITAL ENCOUNTER (OUTPATIENT)
Dept: CT IMAGING | Age: 63
Discharge: HOME | End: 2018-11-17
Payer: COMMERCIAL

## 2018-11-15 DIAGNOSIS — R10.9 RIGHT SIDED ABDOMINAL PAIN: ICD-10-CM

## 2018-11-15 DIAGNOSIS — R10.9 ABDOMINAL PAIN, UNSPECIFIED ABDOMINAL LOCATION: Primary | ICD-10-CM

## 2018-11-15 LAB
CREAT SERPL-MCNC: 0.47 MG/DL (ref 0.5–0.9)
GFR AFRICAN AMERICAN: >60 ML/MIN
GFR NON-AFRICAN AMERICAN: >60 ML/MIN
GFR SERPL CREATININE-BSD FRML MDRD: ABNORMAL ML/MIN/{1.73_M2}
GFR SERPL CREATININE-BSD FRML MDRD: ABNORMAL ML/MIN/{1.73_M2}

## 2018-11-15 PROCEDURE — 74177 CT ABD & PELVIS W/CONTRAST: CPT

## 2018-11-15 PROCEDURE — 6360000004 HC RX CONTRAST MEDICATION: Performed by: INTERNAL MEDICINE

## 2018-11-15 PROCEDURE — 36415 COLL VENOUS BLD VENIPUNCTURE: CPT

## 2018-11-15 PROCEDURE — 2580000003 HC RX 258: Performed by: INTERNAL MEDICINE

## 2018-11-15 PROCEDURE — 82565 ASSAY OF CREATININE: CPT

## 2018-11-15 RX ORDER — 0.9 % SODIUM CHLORIDE 0.9 %
80 INTRAVENOUS SOLUTION INTRAVENOUS ONCE
Status: COMPLETED | OUTPATIENT
Start: 2018-11-15 | End: 2018-11-15

## 2018-11-15 RX ORDER — SODIUM CHLORIDE 0.9 % (FLUSH) 0.9 %
10 SYRINGE (ML) INJECTION
Status: COMPLETED | OUTPATIENT
Start: 2018-11-15 | End: 2018-11-15

## 2018-11-15 RX ADMIN — IOHEXOL 50 ML: 240 INJECTION, SOLUTION INTRATHECAL; INTRAVASCULAR; INTRAVENOUS; ORAL at 09:10

## 2018-11-15 RX ADMIN — SODIUM CHLORIDE 80 ML: 9 INJECTION, SOLUTION INTRAVENOUS at 09:09

## 2018-11-15 RX ADMIN — IOPAMIDOL 75 ML: 755 INJECTION, SOLUTION INTRAVENOUS at 09:08

## 2018-11-15 RX ADMIN — Medication 10 ML: at 09:10

## 2018-11-15 NOTE — TELEPHONE ENCOUNTER
----- Message from Hermila Lord MD sent at 11/15/2018 12:44 PM EST -----  CT scan did not show any acute changes other than gallbladder stones but no inflammation  Order pelvic ultrasound to evaluate for ovaries

## 2018-11-20 DIAGNOSIS — R10.9 RIGHT SIDED ABDOMINAL PAIN: ICD-10-CM

## 2019-03-27 ENCOUNTER — TELEPHONE (OUTPATIENT)
Dept: INTERNAL MEDICINE CLINIC | Age: 64
End: 2019-03-27

## 2019-03-27 DIAGNOSIS — E78.00 HIGH CHOLESTEROL: Primary | ICD-10-CM

## 2019-03-27 DIAGNOSIS — E55.9 VITAMIN D DEFICIENCY: ICD-10-CM

## 2019-04-10 LAB
ALBUMIN SERPL-MCNC: 4 G/DL
ALP BLD-CCNC: 73 U/L
ALT SERPL-CCNC: 14 U/L
ANION GAP SERPL CALCULATED.3IONS-SCNC: NORMAL MMOL/L
AST SERPL-CCNC: 16 U/L
BASOPHILS ABSOLUTE: NORMAL /ΜL
BASOPHILS RELATIVE PERCENT: NORMAL %
BILIRUB SERPL-MCNC: 0.4 MG/DL (ref 0.1–1.4)
BUN BLDV-MCNC: 21 MG/DL
CALCIUM SERPL-MCNC: 8.9 MG/DL
CHLORIDE BLD-SCNC: 104 MMOL/L
CHOLESTEROL, TOTAL: 222 MG/DL
CHOLESTEROL/HDL RATIO: NORMAL
CO2: 24 MMOL/L
CREAT SERPL-MCNC: 0.52 MG/DL
EOSINOPHILS ABSOLUTE: NORMAL /ΜL
EOSINOPHILS RELATIVE PERCENT: NORMAL %
GFR CALCULATED: NORMAL
GLUCOSE BLD-MCNC: 90 MG/DL
HCT VFR BLD CALC: 40 % (ref 36–46)
HDLC SERPL-MCNC: 67 MG/DL (ref 35–70)
HEMOGLOBIN: 13.1 G/DL (ref 12–16)
LDL CHOLESTEROL CALCULATED: 146 MG/DL (ref 0–160)
LYMPHOCYTES ABSOLUTE: NORMAL /ΜL
LYMPHOCYTES RELATIVE PERCENT: NORMAL %
MCH RBC QN AUTO: 30.8 PG
MCHC RBC AUTO-ENTMCNC: 32.8 G/DL
MCV RBC AUTO: 94 FL
MONOCYTES ABSOLUTE: NORMAL /ΜL
MONOCYTES RELATIVE PERCENT: NORMAL %
NEUTROPHILS ABSOLUTE: NORMAL /ΜL
NEUTROPHILS RELATIVE PERCENT: NORMAL %
PLATELET # BLD: 319 K/ΜL
PMV BLD AUTO: NORMAL FL
POTASSIUM SERPL-SCNC: 4.3 MMOL/L
RBC # BLD: NORMAL 10^6/ΜL
SODIUM BLD-SCNC: 142 MMOL/L
TOTAL PROTEIN: 6.8
TRIGL SERPL-MCNC: 47 MG/DL
TSH SERPL DL<=0.05 MIU/L-ACNC: 3.4 UIU/ML
VITAMIN D 25-HYDROXY: 27.6
VITAMIN D2, 25 HYDROXY: NORMAL
VITAMIN D3,25 HYDROXY: NORMAL
VLDLC SERPL CALC-MCNC: 9 MG/DL
WBC # BLD: 6.5 10^3/ML

## 2019-04-12 DIAGNOSIS — E55.9 VITAMIN D DEFICIENCY: ICD-10-CM

## 2019-04-12 DIAGNOSIS — E78.00 HIGH CHOLESTEROL: ICD-10-CM

## 2019-04-23 ENCOUNTER — OFFICE VISIT (OUTPATIENT)
Dept: INTERNAL MEDICINE CLINIC | Age: 64
End: 2019-04-23
Payer: COMMERCIAL

## 2019-04-23 VITALS
WEIGHT: 156.6 LBS | RESPIRATION RATE: 16 BRPM | TEMPERATURE: 98.2 F | HEART RATE: 68 BPM | HEIGHT: 67 IN | SYSTOLIC BLOOD PRESSURE: 112 MMHG | DIASTOLIC BLOOD PRESSURE: 62 MMHG | BODY MASS INDEX: 24.58 KG/M2

## 2019-04-23 DIAGNOSIS — E55.9 VITAMIN D DEFICIENCY: ICD-10-CM

## 2019-04-23 DIAGNOSIS — Z91.09 ENVIRONMENTAL ALLERGIES: ICD-10-CM

## 2019-04-23 DIAGNOSIS — R10.9 RIGHT SIDED ABDOMINAL PAIN: ICD-10-CM

## 2019-04-23 DIAGNOSIS — E78.00 HIGH CHOLESTEROL: Primary | ICD-10-CM

## 2019-04-23 PROCEDURE — 99214 OFFICE O/P EST MOD 30 MIN: CPT | Performed by: INTERNAL MEDICINE

## 2019-04-23 ASSESSMENT — PATIENT HEALTH QUESTIONNAIRE - PHQ9
SUM OF ALL RESPONSES TO PHQ QUESTIONS 1-9: 0
1. LITTLE INTEREST OR PLEASURE IN DOING THINGS: 0
SUM OF ALL RESPONSES TO PHQ9 QUESTIONS 1 & 2: 0
SUM OF ALL RESPONSES TO PHQ QUESTIONS 1-9: 0
2. FEELING DOWN, DEPRESSED OR HOPELESS: 0

## 2019-04-23 NOTE — PROGRESS NOTES
Nadir Coleman is a 59 y.o. female who presents for   Chief Complaint   Patient presents with    Abdominal Pain     having colonoscopy in 2 weeks per Dr Wilian Sena abd pain gone for the most part gone but does come and go    Discuss Labs     recent labs in [facility]    and follow up of chronic medical problems. Patient Active Problem List   Diagnosis    IBS (irritable bowel syndrome)    Panic disorder    Chest pain     HPI  Here for follow-up on labs to check for cholesterol and discuss about right-sided abdominal pain which is resolved except occasionally having some problem    Current Outpatient Medications   Medication Sig Dispense Refill    beclomethasone (QVAR) 80 MCG/ACT inhaler INHALE 1 PUFF ORALLY TWICE DAILY 16 Inhaler 0    fluticasone (FLONASE) 50 MCG/ACT nasal spray USE 1 SPRAY NASALLY DAILY 1 Bottle 5    Cetirizine HCl (ZYRTEC ALLERGY PO) Take 1 tablet by mouth daily       No current facility-administered medications for this visit.         Allergies   Allergen Reactions    Pcn [Penicillins]     Sulfa Antibiotics        Past Medical History:   Diagnosis Date    Asthma     IBS (irritable bowel syndrome)     Panic disorder        Past Surgical History:   Procedure Laterality Date    CYST INCISION AND DRAINAGE Right 11/1997    NASAL POLYP SURGERY         Family History   Problem Relation Age of Onset    High Blood Pressure Mother     High Blood Pressure Father     Cancer Father         melanoma    Heart Disease Maternal Grandfather      ROS   Constitutional:  Negative for fatigue, loss of appetite and unexpected weight change   HEENT            : Negative for neck stiffness and pain, no congestion or sinus pressure   Eyes                : No visual disturbance or pain   Cardiovascular: No chest pain or palpitations or leg swelling   Respiratory      : Negative for cough, shortness of breath or wheezing   Gastrointestinal: Negative for abdominal pain, constipation or diarrhea and bloating No nausea or vomiting   Genitourinary:     No urgency or frequency, no burning or hematuria   Musculoskeletal: No arthralgias, back pain or myalgias   Skin                  : Negative for rash or erythema   Neurological    : Negative for dizziness, weakness, tremors ,light headedness or syncope   Psychiatric       : Negative for dysphoric mood, sleep disturbances, nervous or anxious, or decreased concentration   All other review of systems was negative    Objective  Physical Examination:    Nursing note reviewed    /62 (Site: Right Upper Arm, Position: Sitting, Cuff Size: Medium Adult)   Pulse 68   Temp 98.2 °F (36.8 °C)   Resp 16   Ht 5' 7\" (1.702 m)   Wt 156 lb 9.6 oz (71 kg)   LMP 01/01/2001 (LMP Unknown)   BMI 24.53 kg/m²   BP Readings from Last 3 Encounters:   04/23/19 112/62   11/05/18 113/72   08/28/18 106/64         Constitutional:  Efrain Turner is oriented to place, person and time ,appears well-developed and well-nourished  HEENT:  Atraumatic and normocephalic, external ears normal bilaterally, nose normal no oropharyngeal exudate and is clear and moist  Eyes:  EOCM normal; conjunctivae normal; PERRLA bilaterally  Neck:  Normal range of motion, neck supple, no JVD and no thyromegaly  Cardiovascular:  RRR, normal heart sounds and intact distal pulses  Pulmonary:  effort normal and breath sounds normal bilaterally,no wheezes or rales, no respiratory distress  Abdominal:  Soft, non-tender; normal bowel sounds, no masses  Musculoskeletal:  Normal range of motion and no edema or tenderness bilaterally  No lymphadenopathy  Neurological:  alert, oriented, and normal reflexes bilaterally  Skin: warm and dry  Psychiatric:  normal mood and effect; behavior normal.    Labs:   No results found for: LABA1C  Lab Results   Component Value Date    CHOL 222 04/10/2019     Lab Results   Component Value Date    HDL 67 04/10/2019     Lab Results   Component Value Date    LDLCALC 146 04/10/2019     Lab Results Component Value Date    TRIG 47 04/10/2019     No components found for: [place], [place]  Lab Results   Component Value Date    WBC 6.5 04/10/2019    HGB 13.1 04/10/2019    HCT 40.0 04/10/2019    MCV 94 04/10/2019     04/10/2019     Lab Results   Component Value Date    INR 0.9 01/03/2018    PROTIME 9.7 01/03/2018     Lab Results   Component Value Date    GLUCOSE 90 04/10/2019    CREATININE 0.52 04/10/2019    BUN 21 04/10/2019     04/10/2019    K 4.3 04/10/2019     04/10/2019    CO2 24 04/10/2019     Lab Results   Component Value Date    ALT 14 04/10/2019    AST 16 04/10/2019    ALKPHOS 73 04/10/2019    BILITOT 0.4 04/10/2019     Lab Results   Component Value Date    LABALBU 4.0 04/10/2019     Lab Results   Component Value Date    TSH 3.4 04/10/2019     Assessment:  1. High cholesterol    2. Vitamin D deficiency    3. Environmental allergies    4. Right sided abdominal pain        Plan:  Patient's LDL remains stable at 146 compared to 147 in 2016 and I did discuss with patient about medications and recommended to take a small dose of Lipitor and patient wants to try red yeast rice and so advised patient to monitor and review and repeat lab work in 6 months and decide about medications at that time if needed  Patient vitamin D slightly low at 28.4 and advised patient to take thousand units daily and repeat labs in 6 months  Continue Qvar for allergies and asthma  Patient is going to get a colonoscopy because of right-sided abdominal pain and patient never got the ultrasound of the gallbladder done as ordered and patient wants to wait on that as patient is asymptomatic  Review in 6 months           1. Leo Sunny received counseling on the following healthy behaviors: nutrition and exercise    2. Prior labs and health maintenance reviewed. 3.  Discussed use, benefit, and side effects of prescribed medications. Barriers to medication compliance addressed. All her questions were answered.   Pt voiced understanding. Efrain Turner will continue current medications, diet and exercise. No orders of the defined types were placed in this encounter. Completed Refills               Requested Prescriptions      No prescriptions requested or ordered in this encounter     4. Patient given educational materials - see patient instructions    5. Was a self-tracking handout given in paper form or via [employer]? NO    Orders Placed This Encounter   Procedures    Comprehensive Metabolic Panel     Standing Status:   Future     Standing Expiration Date:   4/22/2020    Lipid Panel     Standing Status:   Future     Standing Expiration Date:   4/22/2020     Order Specific Question:   Is Patient Fasting?/# of Hours     Answer:   15    Vitamin D 25 Hydroxy     Standing Status:   Future     Standing Expiration Date:   4/22/2020     Return in about 6 months (around 10/23/2019). Patient voiced understanding and agreed to treatment plan. Electronically signed by Tricia Parr MD on 4/23/2019 at 11:04 AM    This note is created with a voice recognition program and while intend to generate a document that accurately reflects the content of the visit, no guarantee can be provided that every mistake has been identified and corrected by editing.

## 2019-04-23 NOTE — PROGRESS NOTES
Visit Information    Have you changed or started any medications since your last visit including any over-the-counter medicines, vitamins, or herbal medicines? no   Are you having any side effects from any of your medications? -  no  Have you stopped taking any of your medications? Is so, why? -  no    Have you seen any other physician or provider since your last visit? No  Have you had any other diagnostic tests since your last visit? Yes - Records Obtained  Have you been seen in the emergency room and/or had an admission to a hospital since we last saw you? No  Have you had your routine dental cleaning in the past 6 months? yes -     Have you activated your [employer] account? If not, what are your barriers?  Yes     Patient Care Team:  Ion Boyer MD as PCP - General (Internal Medicine)  Daily Herrera MD as Consulting Physician (Gastroenterology)    Medical History Review  Past Medical, Family, and Social History reviewed and does contribute to the patient presenting condition    Health Maintenance   Topic Date Due    Hepatitis C screen  1955    HIV screen  08/05/1971    Shingles Vaccine (1 of 2) 08/05/2006    DTaP/Tdap/Td vaccine (1 - Tdap) 06/21/2013    Pneumococcal 0-64 years Vaccine (1 of 1 - PPSV23) 12/26/2018    Cervical cancer screen  03/16/2020    Breast cancer screen  10/04/2020    Colon cancer screen colonoscopy  09/16/2023    Lipid screen  04/10/2024    Flu vaccine  Completed

## 2019-05-16 ENCOUNTER — TELEPHONE (OUTPATIENT)
Dept: INTERNAL MEDICINE CLINIC | Age: 64
End: 2019-05-16

## 2019-05-16 DIAGNOSIS — R10.9 RIGHT SIDED ABDOMINAL PAIN: Primary | ICD-10-CM

## 2019-05-16 NOTE — TELEPHONE ENCOUNTER
Health Maintenance   Topic Date Due    Hepatitis C screen  1955    HIV screen  08/05/1971    Shingles Vaccine (1 of 2) 08/05/2006    DTaP/Tdap/Td vaccine (1 - Tdap) 06/21/2013    Pneumococcal 0-64 years Vaccine (1 of 1 - PPSV23) 12/26/2018    Cervical cancer screen  03/16/2020    Breast cancer screen  10/04/2020    Colon cancer screen colonoscopy  09/16/2023    Lipid screen  04/10/2024    Flu vaccine  Completed             (applicable per patient's age: Cancer Screenings, Depression Screening, Fall Risk Screening, Immunizations)    LDL Calculated (mg/dL)   Date Value   04/10/2019 146     AST (U/L)   Date Value   04/10/2019 16     ALT (U/L)   Date Value   04/10/2019 14     BUN (mg/dL)   Date Value   04/10/2019 21      (goal A1C is < 7)   (goal LDL is <100) need 30-50% reduction from baseline     BP Readings from Last 3 Encounters:   04/23/19 112/62   11/05/18 113/72   08/28/18 106/64    (goal /80)      All Future Testing planned in [facility]:  Lab Frequency Next Occurrence   Calprotectin Stool Once 11/05/2018   US PELVIS COMPLETE Once 11/29/2018   Comprehensive Metabolic Panel Once 44/00/2016   Lipid Panel Once 05/04/2019   Vitamin D 25 Hydroxy Once 04/23/2019       Next Visit Date:  Future Appointments   Date Time Provider Kvng Enriquez   10/22/2019 10:45 AM Jaleel Taylor MD [facility]            Patient Active Problem List:     IBS (irritable bowel syndrome)     Panic disorder     Chest pain

## 2019-05-16 NOTE — TELEPHONE ENCOUNTER
She has a history of gallbladder stones and advised patient to get a ultrasound of the gallbladder and also HIDA scan and then follow in the office

## 2019-05-23 ENCOUNTER — TELEPHONE (OUTPATIENT)
Dept: INTERNAL MEDICINE CLINIC | Age: 64
End: 2019-05-23

## 2019-05-23 ENCOUNTER — HOSPITAL ENCOUNTER (OUTPATIENT)
Dept: NUCLEAR MEDICINE | Age: 64
Discharge: HOME | End: 2019-05-25
Payer: COMMERCIAL

## 2019-05-23 ENCOUNTER — HOSPITAL ENCOUNTER (OUTPATIENT)
Dept: ULTRASOUND IMAGING | Age: 64
Discharge: HOME | End: 2019-05-25
Payer: COMMERCIAL

## 2019-05-23 DIAGNOSIS — R10.9 RIGHT SIDED ABDOMINAL PAIN: ICD-10-CM

## 2019-05-23 DIAGNOSIS — K80.00 CALCULUS OF GALLBLADDER WITH ACUTE CHOLECYSTITIS WITHOUT OBSTRUCTION: Primary | ICD-10-CM

## 2019-05-23 PROCEDURE — 3430000000 HC RX DIAGNOSTIC RADIOPHARMACEUTICAL: Performed by: INTERNAL MEDICINE

## 2019-05-23 PROCEDURE — 76705 ECHO EXAM OF ABDOMEN: CPT

## 2019-05-23 PROCEDURE — 78227 HEPATOBIL SYST IMAGE W/DRUG: CPT

## 2019-05-23 PROCEDURE — A9537 TC99M MEBROFENIN: HCPCS | Performed by: INTERNAL MEDICINE

## 2019-05-23 RX ADMIN — Medication 4.5 MILLICURIE: at 09:40

## 2019-05-23 NOTE — TELEPHONE ENCOUNTER
Called patient back to let her know that Dr. Dover Sender does think that this test is necessary but the patient can choose if she wants to have it done or not.     Patient states that she will have the test done

## 2019-05-23 NOTE — TELEPHONE ENCOUNTER
Patient had a US of the gallbladder done already. Says that the hida scan is very expensive, she is asking if there is a true need for this test?    Says that maybe the US should of been done of a bigger area to see why the pain because she knew that she had gall stones all ready.     Please advise asap patient is at the hospital for test to be completed at 9:30

## 2019-05-23 NOTE — TELEPHONE ENCOUNTER
----- Message from Alexandre Irving MD sent at 5/23/2019  1:43 PM EDT -----  Patient's ultrasound of the gallbladder shows gallbladder stones which is known before and HIDA scan showed delayed  excretion of the bile which could be related to the spasm or partial obstruction and I would advise patient to see a surgeon for possible gallbladder removal

## 2019-05-23 NOTE — TELEPHONE ENCOUNTER
It Is already evident that patient has gallstones and HIDA scan is for a different reason to check the gallbladder function which could help the surgeons to decide about surgery and gallbladder removal and if patient does not want it she can hold it and follow with the surgeon for further evaluation and unless I feel there is a true need eye a would not order tests for fun

## 2019-06-04 ENCOUNTER — TELEPHONE (OUTPATIENT)
Dept: INTERNAL MEDICINE CLINIC | Age: 64
End: 2019-06-04

## 2019-06-04 NOTE — TELEPHONE ENCOUNTER
This is a scheduled medication and with the new rules her need to see her before giving the prescription and she can see me on Wednesday or Thursday

## 2019-06-04 NOTE — TELEPHONE ENCOUNTER
6/7/19 patient requesting 10 Xanax due to flying  Patient will departure 6/7/19  Would like medication to be sent to [facility] [address]      Please advise

## 2019-11-19 ENCOUNTER — TELEPHONE (OUTPATIENT)
Dept: INTERNAL MEDICINE CLINIC | Age: 64
End: 2019-11-19

## 2019-11-21 DIAGNOSIS — E78.00 HIGH CHOLESTEROL: ICD-10-CM

## 2019-11-21 LAB
ALBUMIN SERPL-MCNC: NORMAL G/DL
ALP BLD-CCNC: NORMAL U/L
ALT SERPL-CCNC: NORMAL U/L
ANION GAP SERPL CALCULATED.3IONS-SCNC: NORMAL MMOL/L
AST SERPL-CCNC: NORMAL U/L
BILIRUB SERPL-MCNC: NORMAL MG/DL
BUN BLDV-MCNC: NORMAL MG/DL
CALCIUM SERPL-MCNC: NORMAL MG/DL
CHLORIDE BLD-SCNC: NORMAL MMOL/L
CHOLESTEROL, TOTAL: 216 MG/DL
CHOLESTEROL/HDL RATIO: NORMAL
CO2: NORMAL
CREAT SERPL-MCNC: NORMAL MG/DL
GFR CALCULATED: NORMAL
GLUCOSE BLD-MCNC: NORMAL MG/DL
HDLC SERPL-MCNC: 58 MG/DL (ref 35–70)
LDL CHOLESTEROL CALCULATED: 144 MG/DL (ref 0–160)
POTASSIUM SERPL-SCNC: NORMAL MMOL/L
SODIUM BLD-SCNC: NORMAL MMOL/L
TOTAL PROTEIN: NORMAL
TRIGL SERPL-MCNC: 72 MG/DL
VLDLC SERPL CALC-MCNC: 14 MG/DL

## 2019-11-22 ENCOUNTER — NURSE ONLY (OUTPATIENT)
Dept: INTERNAL MEDICINE CLINIC | Age: 64
End: 2019-11-22
Payer: COMMERCIAL

## 2019-11-22 DIAGNOSIS — Z23 NEED FOR INFLUENZA VACCINATION: Primary | ICD-10-CM

## 2019-11-22 PROCEDURE — 90471 IMMUNIZATION ADMIN: CPT | Performed by: INTERNAL MEDICINE

## 2019-11-22 PROCEDURE — 90688 IIV4 VACCINE SPLT 0.5 ML IM: CPT | Performed by: INTERNAL MEDICINE

## 2020-01-03 NOTE — TELEPHONE ENCOUNTER
Last seen 4/23/19, advised pt she is due for appt. Pt states she has appt card for 1/9/20 at noon, ok to schedule? Last filled 4/8/19 16 inhalers    Next Visit Date:  No future appointments.     Health Maintenance   Topic Date Due    Hepatitis C screen  1955    HIV screen  08/05/1971    Shingles Vaccine (1 of 2) 08/05/2006    Cervical cancer screen  03/16/2020    Breast cancer screen  10/04/2020    DTaP/Tdap/Td vaccine (3 - Tdap) 06/20/2023    Colon cancer screen colonoscopy  09/16/2023    Lipid screen  11/19/2024    Flu vaccine  Completed    Pneumococcal 0-64 years Vaccine  Aged Out       No results found for: LABA1C          ( goal A1C is < 7)   No results found for: LABMICR  LDL Calculated (mg/dL)   Date Value   11/19/2019 144   04/10/2019 146       (goal LDL is <100)   AST (U/L)   Date Value   04/10/2019 16     ALT (U/L)   Date Value   04/10/2019 14     BUN (mg/dL)   Date Value   04/10/2019 21     BP Readings from Last 3 Encounters:   04/23/19 112/62   11/05/18 113/72   08/28/18 106/64          (goal 120/80)    All Future Testing planned in [facility]  Lab Frequency Next Occurrence   Vitamin D 25 Hydroxy Once 04/23/2019               Patient Active Problem List:     IBS (irritable bowel syndrome)     Panic disorder     Chest pain

## 2020-01-13 RX ORDER — BUDESONIDE AND FORMOTEROL FUMARATE DIHYDRATE 160; 4.5 UG/1; UG/1
2 AEROSOL RESPIRATORY (INHALATION) 2 TIMES DAILY
Qty: 1 INHALER | Refills: 0 | Status: SHIPPED | OUTPATIENT
Start: 2020-01-13 | End: 2020-08-13 | Stop reason: ALTCHOICE

## 2020-01-16 RX ORDER — FLUTICASONE PROPIONATE 50 MCG
SPRAY, SUSPENSION (ML) NASAL
Qty: 1 BOTTLE | Refills: 0 | Status: SHIPPED | OUTPATIENT
Start: 2020-01-16 | End: 2020-03-17 | Stop reason: SDUPTHER

## 2020-01-27 RX ORDER — ALPRAZOLAM 0.25 MG/1
0.25 TABLET ORAL NIGHTLY PRN
Qty: 10 TABLET | Refills: 0 | OUTPATIENT
Start: 2020-01-27 | End: 2020-02-06

## 2020-01-27 RX ORDER — ALPRAZOLAM 0.25 MG/1
0.25 TABLET ORAL 3 TIMES DAILY PRN
Qty: 5 TABLET | Refills: 0 | OUTPATIENT
Start: 2020-01-27 | End: 2020-03-17 | Stop reason: SDUPTHER

## 2020-01-27 NOTE — TELEPHONE ENCOUNTER
Spoke to Dr Jess Linda- he OKd #5 xanax    Pt in formed, phoned to [facility]

## 2020-02-14 ENCOUNTER — OFFICE VISIT (OUTPATIENT)
Dept: INTERNAL MEDICINE CLINIC | Age: 65
End: 2020-02-14
Payer: COMMERCIAL

## 2020-02-14 VITALS
WEIGHT: 165.2 LBS | TEMPERATURE: 97.5 F | DIASTOLIC BLOOD PRESSURE: 68 MMHG | HEIGHT: 67 IN | BODY MASS INDEX: 25.93 KG/M2 | OXYGEN SATURATION: 97 % | SYSTOLIC BLOOD PRESSURE: 100 MMHG | HEART RATE: 71 BPM

## 2020-02-14 PROCEDURE — 90471 IMMUNIZATION ADMIN: CPT | Performed by: INTERNAL MEDICINE

## 2020-02-14 PROCEDURE — 99214 OFFICE O/P EST MOD 30 MIN: CPT | Performed by: INTERNAL MEDICINE

## 2020-02-14 PROCEDURE — 90732 PPSV23 VACC 2 YRS+ SUBQ/IM: CPT | Performed by: INTERNAL MEDICINE

## 2020-02-14 ASSESSMENT — PATIENT HEALTH QUESTIONNAIRE - PHQ9
SUM OF ALL RESPONSES TO PHQ QUESTIONS 1-9: 0
SUM OF ALL RESPONSES TO PHQ9 QUESTIONS 1 & 2: 0
SUM OF ALL RESPONSES TO PHQ QUESTIONS 1-9: 0
1. LITTLE INTEREST OR PLEASURE IN DOING THINGS: 0
2. FEELING DOWN, DEPRESSED OR HOPELESS: 0

## 2020-02-14 NOTE — PROGRESS NOTES
Dana Osorio is a 62 y.o. female who presents for   Chief Complaint   Patient presents with    6 Month Follow-Up     Pt wants a spot on her left chest checked out. Labs completed on 11-19-19   [place] [facility] Maintenance     wants pneumo 23 vaccine    and follow up of chronic medical problems. Patient Active Problem List   Diagnosis    IBS (irritable bowel syndrome)    Panic disorder    Chest pain     HPI  Here for follow-up on allergies and patient wants to look at a lesion of the skin on the chest wall started few weeks back denies any itching burning or pain and also wants to discuss about flying anxiety and Xanax medication refills and currently she does not need it    Current Outpatient Medications   Medication Sig Dispense Refill    fluticasone (FLONASE) 50 MCG/ACT nasal spray USE 1 SPRAY NASALLY DAILY 1 Bottle 0    beclomethasone (QVAR) 80 MCG/ACT inhaler INHALE 1 PUFF ORALLY TWICE DAILY 16 Inhaler 0    Cetirizine HCl (ZYRTEC ALLERGY PO) Take 1 tablet by mouth daily      budesonide-formoterol (SYMBICORT) 160-4.5 MCG/ACT AERO Inhale 2 puffs into the lungs 2 times daily (Patient not taking: Reported on 2/14/2020) 1 Inhaler 0     No current facility-administered medications for this visit.         Allergies   Allergen Reactions    Pcn [Penicillins]     Sulfa Antibiotics        Past Medical History:   Diagnosis Date    Asthma     IBS (irritable bowel syndrome)     Panic disorder        Past Surgical History:   Procedure Laterality Date    CYST INCISION AND DRAINAGE Right 11/1997    NASAL POLYP SURGERY         Family History   Problem Relation Age of Onset    High Blood Pressure Mother     High Blood Pressure Father     Cancer Father         melanoma    Heart Disease Maternal Grandfather      ROS   Constitutional:  Negative for fatigue, loss of appetite and unexpected weight change   HEENT            : Negative for neck stiffness and pain, no congestion or sinus pressure   Eyes                : effect; behavior normal.    Labs:   No results found for: LABA1C  Lab Results   Component Value Date    CHOL 216 11/19/2019     Lab Results   Component Value Date    HDL 58 11/19/2019     Lab Results   Component Value Date    LDLCALC 144 11/19/2019     Lab Results   Component Value Date    TRIG 72 11/19/2019     No components found for: [place], [place]  Lab Results   Component Value Date    WBC 6.5 04/10/2019    HGB 13.1 04/10/2019    HCT 40.0 04/10/2019    MCV 94 04/10/2019     04/10/2019     Lab Results   Component Value Date    INR 0.9 01/03/2018    PROTIME 9.7 01/03/2018     Lab Results   Component Value Date    GLUCOSE 90 04/10/2019    CREATININE 0.52 04/10/2019    BUN 21 04/10/2019     04/10/2019    K 4.3 04/10/2019     04/10/2019    CO2 24 04/10/2019     Lab Results   Component Value Date    ALT 14 04/10/2019    AST 16 04/10/2019    ALKPHOS 73 04/10/2019    BILITOT 0.4 04/10/2019     Lab Results   Component Value Date    LABALBU 4.0 04/10/2019     Lab Results   Component Value Date    TSH 3.4 04/10/2019     Assessment:  1. Environmental allergies    2. Skin lesion of chest wall    3. High cholesterol    4.  Anxiety with flying        Plan:  Continue Flonase and Zyrtec for allergies and stable denies any new issues  Patient has a raised skin lesion which is round and brownish in color on the left side of the chest wall and patient states it happened recently and because of her family history of melanoma advised patient to follow-up with dermatology and she will be making an appointment shortly  Patient's LDL is 144 compared to 146 last time and I did talk to her about medications and advised patient to repeat lab work in 2 to 3 months and if still high will discuss about medications and I did explain to the patient about the medications including statins and not the side effects  Patient has an issue with flying and gets anxious and takes Xanax as needed and I did inform patient to call me  few weeks

## 2020-03-17 RX ORDER — FLUTICASONE PROPIONATE 50 MCG
SPRAY, SUSPENSION (ML) NASAL
Qty: 1 BOTTLE | Refills: 5 | Status: SHIPPED | OUTPATIENT
Start: 2020-03-17 | End: 2020-08-13 | Stop reason: SDUPTHER

## 2020-03-17 RX ORDER — ALPRAZOLAM 0.25 MG/1
0.25 TABLET ORAL 3 TIMES DAILY PRN
Qty: 5 TABLET | Refills: 0 | Status: SHIPPED | OUTPATIENT
Start: 2020-03-17 | End: 2020-07-23 | Stop reason: SDUPTHER

## 2020-03-17 NOTE — TELEPHONE ENCOUNTER
lv 2/14/20  appt set 8/13/20  Med pended    I called pt regarding xanax request- normally for when she flies  She said she is not flying, but usually exercises to decrease her anxiety- with the gyms closed she cant work out    Asking for a small quantity of xanax

## 2020-07-21 LAB
ALBUMIN SERPL-MCNC: 4.2 G/DL
ALP BLD-CCNC: 69 U/L
ALT SERPL-CCNC: 17 U/L
ANION GAP SERPL CALCULATED.3IONS-SCNC: 1.7 MMOL/L
AST SERPL-CCNC: 23 U/L
BILIRUB SERPL-MCNC: 0.3 MG/DL (ref 0.1–1.4)
BUN BLDV-MCNC: 19 MG/DL
CALCIUM SERPL-MCNC: 9 MG/DL
CHLORIDE BLD-SCNC: 103 MMOL/L
CHOLESTEROL, TOTAL: 225 MG/DL
CHOLESTEROL/HDL RATIO: NORMAL
CO2: 25 MMOL/L
CREAT SERPL-MCNC: 0.58 MG/DL
GFR CALCULATED: 98
GLUCOSE BLD-MCNC: 99 MG/DL
HDLC SERPL-MCNC: 60 MG/DL (ref 35–70)
LDL CHOLESTEROL CALCULATED: 14 MG/DL (ref 0–160)
POTASSIUM SERPL-SCNC: 4.2 MMOL/L
SODIUM BLD-SCNC: 140 MMOL/L
TOTAL CK: 75 U/L
TOTAL PROTEIN: 6.7
TRIGL SERPL-MCNC: 68 MG/DL
VLDLC SERPL CALC-MCNC: 14 MG/DL

## 2020-07-23 RX ORDER — ALPRAZOLAM 0.25 MG/1
0.25 TABLET ORAL 3 TIMES DAILY PRN
Qty: 5 TABLET | Refills: 0 | Status: SHIPPED | OUTPATIENT
Start: 2020-07-23 | End: 2020-08-13 | Stop reason: SDUPTHER

## 2020-08-13 ENCOUNTER — OFFICE VISIT (OUTPATIENT)
Dept: INTERNAL MEDICINE CLINIC | Age: 65
End: 2020-08-13
Payer: COMMERCIAL

## 2020-08-13 VITALS
SYSTOLIC BLOOD PRESSURE: 106 MMHG | BODY MASS INDEX: 25.87 KG/M2 | WEIGHT: 164.8 LBS | RESPIRATION RATE: 16 BRPM | TEMPERATURE: 97.3 F | DIASTOLIC BLOOD PRESSURE: 70 MMHG | HEIGHT: 67 IN | HEART RATE: 64 BPM

## 2020-08-13 PROCEDURE — 99214 OFFICE O/P EST MOD 30 MIN: CPT | Performed by: INTERNAL MEDICINE

## 2020-08-13 RX ORDER — FLUTICASONE PROPIONATE 50 MCG
SPRAY, SUSPENSION (ML) NASAL
Qty: 1 BOTTLE | Refills: 5 | Status: SHIPPED | OUTPATIENT
Start: 2020-08-13

## 2020-08-13 RX ORDER — ALPRAZOLAM 0.25 MG/1
0.25 TABLET ORAL 3 TIMES DAILY PRN
Qty: 5 TABLET | Refills: 0 | Status: SHIPPED | OUTPATIENT
Start: 2020-08-13 | End: 2020-08-18

## 2020-08-13 RX ORDER — BUSPIRONE HYDROCHLORIDE 5 MG/1
5 TABLET ORAL 2 TIMES DAILY PRN
Qty: 60 TABLET | Refills: 0 | Status: SHIPPED | OUTPATIENT
Start: 2020-08-13 | End: 2020-09-12

## 2020-08-13 RX ORDER — ATORVASTATIN CALCIUM 10 MG/1
10 TABLET, FILM COATED ORAL
Qty: 30 TABLET | Refills: 0 | Status: SHIPPED | OUTPATIENT
Start: 2020-08-13 | End: 2020-12-16

## 2020-08-13 ASSESSMENT — PATIENT HEALTH QUESTIONNAIRE - PHQ9
SUM OF ALL RESPONSES TO PHQ QUESTIONS 1-9: 0
2. FEELING DOWN, DEPRESSED OR HOPELESS: 0
1. LITTLE INTEREST OR PLEASURE IN DOING THINGS: 0
SUM OF ALL RESPONSES TO PHQ9 QUESTIONS 1 & 2: 0
SUM OF ALL RESPONSES TO PHQ QUESTIONS 1-9: 0

## 2020-08-13 NOTE — PROGRESS NOTES
Cindy Robbins is a 60 y.o. female who presents for   Chief Complaint   Patient presents with    Asthma     absolutely no breathing issues since taking Qvar daily    Anxiety     wants to xanax with DR- feels her BP and pulse rise with an Alonso Saleh Discuss Labs     had labs at [facility] in July    and follow up of chronic medical problems. Patient Active Problem List   Diagnosis    IBS (irritable bowel syndrome)    Panic disorder    Chest pain     HPI  Here for follow-up on labs done recently for cholesterol and also discuss about her anxiety    Current Outpatient Medications   Medication Sig Dispense Refill    beclomethasone (QVAR) 80 MCG/ACT inhaler INHALE 1 PUFF ORALLY TWICE DAILY 16 Inhaler 5    fluticasone (FLONASE) 50 MCG/ACT nasal spray USE 1 SPRAY NASALLY DAILY 1 Bottle 5    atorvastatin (LIPITOR) 10 MG tablet Take 1 tablet by mouth Twice a Week 30 tablet 0    busPIRone (BUSPAR) 5 MG tablet Take 1 tablet by mouth 2 times daily as needed (anxiety) 60 tablet 0    ALPRAZolam (XANAX) 0.25 MG tablet Take 1 tablet by mouth 3 times daily as needed for Anxiety for up to 5 days. 5 tablet 0    Cetirizine HCl (ZYRTEC ALLERGY PO) Take 1 tablet by mouth daily       No current facility-administered medications for this visit.         Allergies   Allergen Reactions    Pcn [Penicillins]     Sulfa Antibiotics        Past Medical History:   Diagnosis Date    Asthma     IBS (irritable bowel syndrome)     Panic disorder        Past Surgical History:   Procedure Laterality Date    CYST INCISION AND DRAINAGE Right 11/1997    NASAL POLYP SURGERY         Family History   Problem Relation Age of Onset    High Blood Pressure Mother     High Blood Pressure Father     Cancer Father         melanoma    Heart Disease Maternal Grandfather      ROS   Constitutional:  Negative for fatigue, loss of appetite and unexpected weight change   HEENT            : Negative for neck stiffness and pain, no congestion or sinus pressure   Eyes                : No visual disturbance or pain   Cardiovascular: No chest pain or palpitations or leg swelling   Respiratory      : Negative for cough, shortness of breath or wheezing   Gastrointestinal: Negative for abdominal pain, constipation or diarrhea and bloating No nausea or vomiting   Genitourinary:     No urgency or frequency, no burning or hematuria   Musculoskeletal: No arthralgias, back pain or myalgias   Skin                  : Negative for rash or erythema   Neurological    : Negative for dizziness, weakness, tremors ,light headedness or syncope   Psychiatric       : Negative for dysphoric mood, sleep disturbances, nervous or anxious, or decreased concentration   All other review of systems was negative    Objective  Physical Examination:    Nursing note reviewed    /70 (Site: Right Upper Arm, Position: Sitting, Cuff Size: Medium Adult)   Pulse 64   Temp 97.3 °F (36.3 °C) (Infrared)   Resp 16   Ht 5' 6.5\" (1.689 m)   Wt 164 lb 12.8 oz (74.8 kg)   LMP 01/01/2001 (LMP Unknown)   BMI 26.20 kg/m²   BP Readings from Last 3 Encounters:   08/13/20 106/70   02/14/20 100/68   04/23/19 112/62         Constitutional:  Leo Barton is oriented to place, person and time ,appears well-developed and well-nourished  HEENT:  Atraumatic and normocephalic, external ears normal bilaterally, nose normal no oropharyngeal exudate and is clear and moist  Eyes:  EOCM normal; conjunctivae normal; PERRLA bilaterally  Neck:  Normal range of motion, neck supple, no JVD and no thyromegaly  Cardiovascular:  RRR, normal heart sounds and intact distal pulses  Pulmonary:  effort normal and breath sounds normal bilaterally,no wheezes or rales, no respiratory distress  Abdominal:  Soft, non-tender; normal bowel sounds, no masses  Musculoskeletal:  Normal range of motion and no edema or tenderness bilaterally  No lymphadenopathy  Neurological:  alert, oriented, and normal reflexes bilaterally  Skin: warm and dry  Psychiatric:  normal mood and effect; behavior normal.    Labs:   No results found for: LABA1C  Lab Results   Component Value Date    CHOL 216 11/19/2019     Lab Results   Component Value Date    HDL 58 11/19/2019     Lab Results   Component Value Date    LDLCALC 144 11/19/2019     Lab Results   Component Value Date    TRIG 72 11/19/2019     No components found for: [place], [place]  Lab Results   Component Value Date    WBC 6.5 04/10/2019    HGB 13.1 04/10/2019    HCT 40.0 04/10/2019    MCV 94 04/10/2019     04/10/2019     Lab Results   Component Value Date    INR 0.9 01/03/2018    PROTIME 9.7 01/03/2018     Lab Results   Component Value Date    GLUCOSE 90 04/10/2019    CREATININE 0.52 04/10/2019    BUN 21 04/10/2019     04/10/2019    K 4.3 04/10/2019     04/10/2019    CO2 24 04/10/2019     Lab Results   Component Value Date    ALT 14 04/10/2019    AST 16 04/10/2019    ALKPHOS 73 04/10/2019    BILITOT 0.4 04/10/2019     Lab Results   Component Value Date    LABALBU 4.0 04/10/2019     Lab Results   Component Value Date    TSH 3.4 04/10/2019     Assessment:  1. High cholesterol    2. Anxiety    3.  Environmental allergies        Plan:  Patient's LDL is still elevated at 151 and last time it was 144 and patient follows diet and exercise and we did discuss about medications and started on Lipitor 10 mg twice a week Monday and Friday and will repeat lab work including CBC CMP CK and FLP in 3 months  Patient still complaining of anxiety and taking Xanax as needed and I did explain to the patient about risks of taking Xanax and habit formation and developing tolerance and suggested taking long-term medication like Lexapro and patient is not interested we also discussed about trying BuSpar as needed instead of taking daily and a prescription for both Xanax and BuSpar given to the patient and advised to try BuSpar first and call me back in 3 to 4 weeks  Patient's allergies are stable and continue current treatment  Patient to follow-up with her OB and GYN for mammogram  Review in 3 months           1. Efra Johnson received counseling on the following healthy behaviors: nutrition and exercise    2. Prior labs and health maintenance reviewed. 3.  Discussed use, benefit, and side effects of prescribed medications. Barriers to medication compliance addressed. All her questions were answered. Pt voiced understanding. Efra Johnson will continue current medications, diet and exercise. Orders Placed This Encounter   Medications    beclomethasone (QVAR) 80 MCG/ACT inhaler     Sig: INHALE 1 PUFF ORALLY TWICE DAILY     Dispense:  16 Inhaler     Refill:  5    fluticasone (FLONASE) 50 MCG/ACT nasal spray     Sig: USE 1 SPRAY NASALLY DAILY     Dispense:  1 Bottle     Refill:  5    atorvastatin (LIPITOR) 10 MG tablet     Sig: Take 1 tablet by mouth Twice a Week     Dispense:  30 tablet     Refill:  0    busPIRone (BUSPAR) 5 MG tablet     Sig: Take 1 tablet by mouth 2 times daily as needed (anxiety)     Dispense:  60 tablet     Refill:  0    ALPRAZolam (XANAX) 0.25 MG tablet     Sig: Take 1 tablet by mouth 3 times daily as needed for Anxiety for up to 5 days. Dispense:  5 tablet     Refill:  0     Fax to [phone]          Completed Refills               Requested Prescriptions     Signed Prescriptions Disp Refills    beclomethasone (QVAR) 80 MCG/ACT inhaler 16 Inhaler 5     Sig: INHALE 1 PUFF ORALLY TWICE DAILY    fluticasone (FLONASE) 50 MCG/ACT nasal spray 1 Bottle 5     Sig: USE 1 SPRAY NASALLY DAILY    atorvastatin (LIPITOR) 10 MG tablet 30 tablet 0     Sig: Take 1 tablet by mouth Twice a Week    busPIRone (BUSPAR) 5 MG tablet 60 tablet 0     Sig: Take 1 tablet by mouth 2 times daily as needed (anxiety)    ALPRAZolam (XANAX) 0.25 MG tablet 5 tablet 0     Sig: Take 1 tablet by mouth 3 times daily as needed for Anxiety for up to 5 days.      4. Patient given educational materials - see patient instructions    5. Was a self-tracking handout given in paper form or via [employer]? NO    Orders Placed This Encounter   Procedures    Comprehensive Metabolic Panel     Standing Status:   Future     Standing Expiration Date:   8/13/2021    Lipid Panel     Standing Status:   Future     Standing Expiration Date:   8/13/2021     Order Specific Question:   Is Patient Fasting?/# of Hours     Answer:   12    CBC     Standing Status:   Future     Standing Expiration Date:   8/13/2021    CK     Standing Status:   Future     Standing Expiration Date:   8/13/2021     Return in about 3 months (around 11/13/2020). Patient voiced understanding and agreed to treatment plan. Electronically signed by Nirmal Carney MD on 8/13/2020 at 11:07 AM    This note is created with a voice recognition program and while intend to generate a document that accurately reflects the content of the visit, no guarantee can be provided that every mistake has been identified and corrected by editing.

## 2020-11-17 LAB
ALBUMIN SERPL-MCNC: 4.2 G/DL
ALP BLD-CCNC: 67 U/L
ALT SERPL-CCNC: 21 U/L
ANION GAP SERPL CALCULATED.3IONS-SCNC: 1.6 MMOL/L
AST SERPL-CCNC: 18 U/L
BASOPHILS ABSOLUTE: NORMAL
BASOPHILS RELATIVE PERCENT: NORMAL
BILIRUB SERPL-MCNC: 0.4 MG/DL (ref 0.1–1.4)
BUN BLDV-MCNC: 13 MG/DL
CALCIUM SERPL-MCNC: 9 MG/DL
CHLORIDE BLD-SCNC: 102 MMOL/L
CHOLESTEROL, TOTAL: 206 MG/DL
CHOLESTEROL/HDL RATIO: NORMAL
CO2: 24 MMOL/L
CREAT SERPL-MCNC: 0.58 MG/DL
EOSINOPHILS ABSOLUTE: NORMAL
EOSINOPHILS RELATIVE PERCENT: NORMAL
GFR CALCULATED: 98
GLUCOSE BLD-MCNC: 89 MG/DL
HCT VFR BLD CALC: 37.2 % (ref 36–46)
HDLC SERPL-MCNC: 63 MG/DL (ref 35–70)
HEMOGLOBIN: 12.8 G/DL (ref 12–16)
LDL CHOLESTEROL CALCULATED: 128 MG/DL (ref 0–160)
LYMPHOCYTES ABSOLUTE: NORMAL
LYMPHOCYTES RELATIVE PERCENT: NORMAL
MCH RBC QN AUTO: 33.1 PG
MCHC RBC AUTO-ENTMCNC: 34.4 G/DL
MCV RBC AUTO: 96 FL
MONOCYTES ABSOLUTE: NORMAL
MONOCYTES RELATIVE PERCENT: NORMAL
NEUTROPHILS ABSOLUTE: NORMAL
NEUTROPHILS RELATIVE PERCENT: NORMAL
NONHDLC SERPL-MCNC: NORMAL MG/DL
PLATELET # BLD: 277 K/ΜL
PMV BLD AUTO: NORMAL FL
POTASSIUM SERPL-SCNC: 4.3 MMOL/L
RBC # BLD: 3.87 10^6/ΜL
SODIUM BLD-SCNC: 139 MMOL/L
TOTAL CK: 60 U/L
TOTAL PROTEIN: 6.8
TRIGL SERPL-MCNC: 82 MG/DL
VLDLC SERPL CALC-MCNC: 15 MG/DL
WBC # BLD: 6.2 10^3/ML

## 2020-12-03 ENCOUNTER — TELEPHONE (OUTPATIENT)
Dept: INTERNAL MEDICINE CLINIC | Age: 65
End: 2020-12-03

## 2020-12-03 NOTE — TELEPHONE ENCOUNTER
Cholesterol is improving and increase Lipitor to 3 times a week if she has no issues  And will repeat labs in 3 months and follow in office as scheduled

## 2020-12-16 RX ORDER — ATORVASTATIN CALCIUM 10 MG/1
TABLET, FILM COATED ORAL
Qty: 22 TABLET | Refills: 0 | Status: SHIPPED | OUTPATIENT
Start: 2020-12-16 | End: 2021-08-16

## 2021-01-04 ENCOUNTER — OFFICE VISIT (OUTPATIENT)
Dept: INTERNAL MEDICINE CLINIC | Age: 66
End: 2021-01-04
Payer: COMMERCIAL

## 2021-01-04 VITALS
TEMPERATURE: 97.5 F | DIASTOLIC BLOOD PRESSURE: 64 MMHG | SYSTOLIC BLOOD PRESSURE: 110 MMHG | WEIGHT: 166.6 LBS | HEART RATE: 64 BPM | HEIGHT: 67 IN | RESPIRATION RATE: 16 BRPM | BODY MASS INDEX: 26.15 KG/M2

## 2021-01-04 DIAGNOSIS — M25.511 CHRONIC RIGHT SHOULDER PAIN: Primary | ICD-10-CM

## 2021-01-04 DIAGNOSIS — Z91.09 ENVIRONMENTAL ALLERGIES: ICD-10-CM

## 2021-01-04 DIAGNOSIS — E78.00 HIGH CHOLESTEROL: ICD-10-CM

## 2021-01-04 DIAGNOSIS — G89.29 CHRONIC RIGHT SHOULDER PAIN: Primary | ICD-10-CM

## 2021-01-04 DIAGNOSIS — R51.9 LEFT-SIDED FACE PAIN: ICD-10-CM

## 2021-01-04 DIAGNOSIS — R41.840 ATTENTION DEFICIT: ICD-10-CM

## 2021-01-04 PROCEDURE — 99214 OFFICE O/P EST MOD 30 MIN: CPT | Performed by: INTERNAL MEDICINE

## 2021-01-04 RX ORDER — MULTIVIT-MIN/FERROUS FUMARATE 9 MG/15 ML
LIQUID (ML) ORAL
COMMUNITY

## 2021-01-04 RX ORDER — ZINC GLUCONATE 50 MG
50 TABLET ORAL DAILY
COMMUNITY
End: 2021-05-27

## 2021-01-04 RX ORDER — MULTIVIT WITH MINERALS/LUTEIN
250 TABLET ORAL DAILY
COMMUNITY

## 2021-01-04 RX ORDER — M-VIT,TX,IRON,MINS/CALC/FOLIC 27MG-0.4MG
1 TABLET ORAL DAILY
COMMUNITY

## 2021-01-04 ASSESSMENT — PATIENT HEALTH QUESTIONNAIRE - PHQ9: SUM OF ALL RESPONSES TO PHQ9 QUESTIONS 1 & 2: 0

## 2021-01-04 NOTE — PROGRESS NOTES
Denisha Lopez is a 60 y.o. female who presents for   Chief Complaint   Patient presents with    Hyperlipidemia     LABS 11/16/20    Shoulder Pain     RIGHT shoulder pain for 1 year off and on- sees chiro but still pain    Other     co problem with concentration and focus    Headache     dentist fitting her with bite guard     and follow up of chronic medical problems. Patient Active Problem List   Diagnosis    IBS (irritable bowel syndrome)    Panic disorder    Chest pain     HPI  Here for follow-up on cholesterol and patient complains of right shoulder pain going on for a year and also complains of attention deficit disorder as patient could not focus on the task and also complains of left eyebrow pain    Current Outpatient Medications   Medication Sig Dispense Refill    Multiple Vitamins-Minerals (THERAPEUTIC MULTIVITAMIN-MINERALS) tablet Take 1 tablet by mouth daily      Ascorbic Acid (VITAMIN C) 250 MG tablet Take 250 mg by mouth daily      ELDERBERRY PO Take by mouth      zinc gluconate 50 MG tablet Take 50 mg by mouth daily      Coenzyme Q10 (CO Q 10 PO) Take by mouth      Cholecalciferol (VITAMIN D3) 30 MCG/15ML LIQD Take by mouth      atorvastatin (LIPITOR) 10 MG tablet TAKE ONE TABLET BY MOUTH TWICE A WEEK 22 tablet 0    beclomethasone (QVAR) 80 MCG/ACT inhaler INHALE 1 PUFF ORALLY TWICE DAILY 16 Inhaler 5    fluticasone (FLONASE) 50 MCG/ACT nasal spray USE 1 SPRAY NASALLY DAILY 1 Bottle 5    Cetirizine HCl (ZYRTEC ALLERGY PO) Take 1 tablet by mouth daily       No current facility-administered medications for this visit.         Allergies   Allergen Reactions    Pcn [Penicillins]     Sulfa Antibiotics        Past Medical History:   Diagnosis Date    Asthma     IBS (irritable bowel syndrome)     Panic disorder        Past Surgical History:   Procedure Laterality Date    CYST INCISION AND DRAINAGE Right 11/1997    NASAL POLYP SURGERY         Family History Problem Relation Age of Onset    High Blood Pressure Mother     High Blood Pressure Father     Cancer Father         melanoma    Heart Disease Maternal Grandfather      ROS  ? Constitutional:  Negative for fatigue, loss of appetite and unexpected weight change  ? HEENT            : Negative for neck stiffness and pain, no congestion or sinus pressure  ? Eyes                : No visual disturbance or pain  ? Cardiovascular: No chest pain or palpitations or leg swelling  ? Respiratory      : Negative for cough, shortness of breath or wheezing  ? Gastrointestinal: Negative for abdominal pain, constipation or diarrhea and bloating No nausea or vomiting  ? Genitourinary:     No urgency or frequency, no burning or hematuria  ? Musculoskeletal: Positive for arthralgias, back pain or myalgias  ? Skin                  : Negative for rash or erythema  ? Neurological    : Negative for dizziness, weakness, tremors ,light headedness or syncope  ? Psychiatric       : Negative for dysphoric mood, sleep disturbances, nervous or anxious, or decreased concentration  ?  All other review of systems was negative    Objective  Physical Examination:    Nursing note reviewed    /64 (Site: Left Upper Arm, Position: Sitting, Cuff Size: Medium Adult)   Pulse 64   Temp 97.5 °F (36.4 °C) (Infrared)   Resp 16   Ht 5' 7\" (1.702 m)   Wt 166 lb 9.6 oz (75.6 kg)   LMP 01/01/2001 (LMP Unknown)   BMI 26.09 kg/m²   BP Readings from Last 3 Encounters:   01/04/21 110/64   08/13/20 106/70   02/14/20 100/68         Constitutional:  Tara Guerrier is oriented to place, person and time ,appears well-developed and well-nourished  HEENT:  Atraumatic and normocephalic, external ears normal bilaterally, nose normal no oropharyngeal exudate and is clear and moist  Eyes:  EOCM normal; conjunctivae normal; PERRLA bilaterally  Neck:  Normal range of motion, neck supple, no JVD and no thyromegaly Cardiovascular:  RRR, normal heart sounds and intact distal pulses  Pulmonary:  effort normal and breath sounds normal bilaterally,no wheezes or rales, no respiratory distress  Abdominal:  Soft, non-tender; normal bowel sounds, no masses  Musculoskeletal: Limited range of motion and no edema or tenderness bilaterally  No lymphadenopathy  Neurological:  alert, oriented, and normal reflexes bilaterally  Skin: warm and dry  Psychiatric:  normal mood and effect; behavior normal.    Labs:   No results found for: LABA1C  Lab Results   Component Value Date    CHOL 206 11/16/2020     Lab Results   Component Value Date    HDL 63 11/16/2020     Lab Results   Component Value Date    LDLCALC 128 11/16/2020     Lab Results   Component Value Date    TRIG 82 11/16/2020     No components found for: CHOLHDL  Lab Results   Component Value Date    WBC 6.2 11/16/2020    HGB 12.8 11/16/2020    HCT 37.2 11/16/2020    MCV 96 11/16/2020     11/16/2020     Lab Results   Component Value Date    INR 0.9 01/03/2018    PROTIME 9.7 01/03/2018     Lab Results   Component Value Date    GLUCOSE 89 11/16/2020    CREATININE 0.58 11/16/2020    BUN 13 11/16/2020     11/16/2020    K 4.3 11/16/2020     11/16/2020    CO2 24 11/16/2020     Lab Results   Component Value Date    ALT 21 11/16/2020    AST 18 11/16/2020    ALKPHOS 67 11/16/2020    BILITOT 0.4 11/16/2020     Lab Results   Component Value Date    LABALBU 4.2 11/16/2020     Lab Results   Component Value Date    TSH 3.4 04/10/2019     Assessment:  1. Chronic right shoulder pain    2. Left-sided face pain    3. High cholesterol    4. Environmental allergies    5.  Attention deficit        Plan:  Patient's right shoulder has limited movements and seems to be a like a frozen shoulder and advised patient to follow-up with orthopedics and referral was made Patient complaining of attention deficit disorder advised to follow-up with psychologist for documentation and evaluation before prescribing any medications and accordingly referral was made  Patient's cholesterol profile improved and  compared to 151 and patient taking Lipitor only twice a week and patient prefers to stay at the same dosage and will repeat lab work in 3 months  Patient complains of pain under the left eyebrow and patient is seeing the dentist and giving a temporary mouthguard which did help her and will be getting a permanent mouthguard on Wednesday and advised patient to use it and call me back in 2 to 3 weeks if no improvement will refer to ENT and also advised her to see the eye doctor for evaluation  Discussed about Covid vaccination  Review in 3 months           1. Chriss Lee received counseling on the following healthy behaviors: nutrition and exercise    2. Prior labs and health maintenance reviewed. 3.  Discussed use, benefit, and side effects of prescribed medications. Barriers to medication compliance addressed. All her questions were answered. Pt voiced understanding. Chriss Lee will continue current medications, diet and exercise. No orders of the defined types were placed in this encounter. Completed Refills               Requested Prescriptions      No prescriptions requested or ordered in this encounter     4. Patient given educational materials - see patient instructions    5. Was a self-tracking handout given in paper form or via [employer]?   NO    Orders Placed This Encounter   Procedures    Comprehensive Metabolic Panel     Standing Status:   Future     Standing Expiration Date:   1/4/2022    CBC     Standing Status:   Future     Standing Expiration Date:   1/4/2022    Lipid Panel     Standing Status:   Future     Standing Expiration Date:   1/4/2022     Order Specific Question:   Is Patient Fasting?/# of Hours     Answer:   12    TSH without Reflex Standing Status:   Future     Standing Expiration Date:   1/4/2022   Andreina Ariza, PhD, Psychology, [place]     Referral Priority:   Routine     Referral Type:   Eval and Treat     Referral Reason:   Specialty Services Required     Referred to Provider:   Ciera Wells, PhD     Requested Specialty:   Psychology     Number of Visits Requested:   Mayur Villagran MD, Orthopedic Surgery, [facility]     Referral Priority:   Routine     Referral Type:   Eval and Treat     Referral Reason:   Specialty Services Required     Referred to Provider:   Chica Salas MD     Requested Specialty:   Orthopedic Surgery     Number of Visits Requested:   1     Return in about 3 months (around 4/4/2021). Patient voiced understanding and agreed to treatment plan. Electronically signed by Collette Males, MD on 1/4/2021 at 12:04 PM    This note is created with a voice recognition program and while intend to generate a document that accurately reflects the content of the visit, no guarantee can be provided that every mistake has been identified and corrected by editing.

## 2021-02-01 DIAGNOSIS — M25.511 CHRONIC RIGHT SHOULDER PAIN: Primary | ICD-10-CM

## 2021-02-01 DIAGNOSIS — G89.29 CHRONIC RIGHT SHOULDER PAIN: Primary | ICD-10-CM

## 2021-02-23 ENCOUNTER — TELEPHONE (OUTPATIENT)
Dept: INTERNAL MEDICINE CLINIC | Age: 66
End: 2021-02-23

## 2021-02-23 DIAGNOSIS — Z12.31 ENCOUNTER FOR SCREENING MAMMOGRAM FOR MALIGNANT NEOPLASM OF BREAST: Primary | ICD-10-CM

## 2021-02-23 NOTE — TELEPHONE ENCOUNTER
Received letter from [facility] that pt needs mammogram- she does have scheduled for 2/26/21 at [address] [place] [address] placed for confirmation

## 2021-02-26 ENCOUNTER — HOSPITAL ENCOUNTER (OUTPATIENT)
Dept: MAMMOGRAPHY | Age: 66
Discharge: HOME | End: 2021-02-28
Payer: COMMERCIAL

## 2021-02-26 DIAGNOSIS — Z12.31 ENCOUNTER FOR SCREENING MAMMOGRAM FOR MALIGNANT NEOPLASM OF BREAST: ICD-10-CM

## 2021-02-26 PROCEDURE — 77063 BREAST TOMOSYNTHESIS BI: CPT

## 2021-05-25 ENCOUNTER — TELEPHONE (OUTPATIENT)
Dept: INTERNAL MEDICINE CLINIC | Age: 66
End: 2021-05-25

## 2021-05-25 ENCOUNTER — HOSPITAL ENCOUNTER (EMERGENCY)
Age: 66
Discharge: HOME | End: 2021-05-25
Attending: EMERGENCY MEDICINE
Payer: COMMERCIAL

## 2021-05-25 VITALS
RESPIRATION RATE: 16 BRPM | OXYGEN SATURATION: 97 % | TEMPERATURE: 98.4 F | BODY MASS INDEX: 24.48 KG/M2 | WEIGHT: 156 LBS | HEIGHT: 67 IN | DIASTOLIC BLOOD PRESSURE: 84 MMHG | SYSTOLIC BLOOD PRESSURE: 134 MMHG | HEART RATE: 92 BPM

## 2021-05-25 DIAGNOSIS — G89.29 ACUTE EXACERBATION OF CHRONIC LOW BACK PAIN: Primary | ICD-10-CM

## 2021-05-25 DIAGNOSIS — M54.50 ACUTE EXACERBATION OF CHRONIC LOW BACK PAIN: Primary | ICD-10-CM

## 2021-05-25 PROCEDURE — 99283 EMERGENCY DEPT VISIT LOW MDM: CPT

## 2021-05-25 RX ORDER — TIZANIDINE 2 MG/1
2 TABLET ORAL NIGHTLY PRN
Qty: 10 TABLET | Refills: 0 | Status: SHIPPED | OUTPATIENT
Start: 2021-05-25 | End: 2021-08-16

## 2021-05-25 RX ORDER — LIDOCAINE 50 MG/G
1 PATCH TOPICAL DAILY
Qty: 10 PATCH | Refills: 0 | Status: SHIPPED | OUTPATIENT
Start: 2021-05-25 | End: 2021-06-04

## 2021-05-25 RX ORDER — IBUPROFEN 200 MG
600 TABLET ORAL EVERY 8 HOURS PRN
Qty: 30 TABLET | Refills: 0 | Status: SHIPPED | OUTPATIENT
Start: 2021-05-25 | End: 2021-08-16

## 2021-05-25 ASSESSMENT — ENCOUNTER SYMPTOMS
COUGH: 0
WHEEZING: 0
VOMITING: 0
DIARRHEA: 0
ABDOMINAL PAIN: 0
BACK PAIN: 1
SHORTNESS OF BREATH: 0
VOICE CHANGE: 0
NAUSEA: 0
FACIAL SWELLING: 0

## 2021-05-25 ASSESSMENT — PAIN SCALES - GENERAL: PAINLEVEL_OUTOF10: 7

## 2021-05-25 NOTE — ED PROVIDER NOTES
[address] [place] [address] ED  EMERGENCY DEPARTMENT ENCOUNTER      Pt Name: Pilar Bamberger  MRN: [id number]  [place] 1955  Date of evaluation: 5/25/2021  Provider: MD Mika Echevarria     Chief Complaint   Patient presents with    Back Pain     onset 6 weeks ago, no injury         HISTORY OF PRESENT ILLNESS   (Location/Symptom, Timing/Onset, Context/Setting,Quality, Duration, Modifying Factors, Severity)  Note limiting factors. Pilar Bamberger is a60 y.o. female who presents to the emergency department      HPI    29-year-old female who has had low back pain for 6 weeks. She is seen a chiropractor and has an appointment with Ortho next month. She is only taking Motrin occasionally, she is not doing any stretching currently not take any muscle relaxers, not using any topicals, is occasionally using ice. She states the pain is left lower, does have some rating down the leg however no numbness, weakness, trouble with her bowel or bladder, or any red flags. She is ambulatory, states pain began to get worse today so she came in.    Nursing Notes werereviewed. REVIEW OF SYSTEMS    (2-9 systems for level 4, 10 or more for level 5)     Review of Systems   Constitutional: Negative for appetite change, chills, fatigue and fever. HENT: Negative for drooling, facial swelling, mouth sores and voice change. Eyes: Negative for visual disturbance. Respiratory: Negative for cough, shortness of breath and wheezing. Cardiovascular: Negative for chest pain. Gastrointestinal: Negative for abdominal pain, diarrhea, nausea and vomiting. Genitourinary: Negative for difficulty urinating and dysuria. Musculoskeletal: Positive for back pain. Negative for neck stiffness. Skin: Negative for rash. Neurological: Negative for weakness and numbness. Psychiatric/Behavioral: Negative for agitation. All other systems reviewed and are negative.       Except as noted above the remainder of the review of systems was reviewed and negative. PAST MEDICAL HISTORY     Past Medical History:   Diagnosis Date    Asthma     IBS (irritable bowel syndrome)     Panic disorder          SURGICALHISTORY       Past Surgical History:   Procedure Laterality Date    CYST INCISION AND DRAINAGE Right 11/1997    NASAL POLYP SURGERY           CURRENT MEDICATIONS       Previous Medications    ASCORBIC ACID (VITAMIN C) 250 MG TABLET    Take 250 mg by mouth daily    ATORVASTATIN (LIPITOR) 10 MG TABLET    TAKE ONE TABLET BY MOUTH TWICE A WEEK    BECLOMETHASONE (QVAR) 80 MCG/ACT INHALER    INHALE 1 PUFF ORALLY TWICE DAILY    CETIRIZINE HCL (ZYRTEC ALLERGY PO)    Take 1 tablet by mouth daily    CHOLECALCIFEROL (VITAMIN D3) 30 MCG/15ML LIQD    Take by mouth    COENZYME Q10 (CO Q 10 PO)    Take by mouth    ELDERBERRY PO    Take by mouth    FLUTICASONE (FLONASE) 50 MCG/ACT NASAL SPRAY    USE 1 SPRAY NASALLY DAILY    MULTIPLE VITAMINS-MINERALS (THERAPEUTIC MULTIVITAMIN-MINERALS) TABLET    Take 1 tablet by mouth daily    ZINC GLUCONATE 50 MG TABLET    Take 50 mg by mouth daily         ALLERGIES   Pcn [penicillins] and Sulfa antibiotics    FAMILY HISTORY       Family History   Problem Relation Age of Onset    High Blood Pressure Mother     High Blood Pressure Father     Cancer Father         melanoma    Heart Disease Maternal Grandfather           SOCIAL HISTORY       Social History     Socioeconomic History    Marital status:      Spouse name: None    Number of children: None    Years of education: None    Highest education level: None   Occupational History    None   Tobacco Use    Smoking status: Never Smoker    Smokeless tobacco: Never Used   Substance and Sexual Activity    Alcohol use:  Yes     Alcohol/week: 7.0 standard drinks     Types: 7 Glasses of wine per week     Comment: 1-2 a few nights a week    Drug use: No    Sexual activity: None   Other Topics Concern    None   Social History Narrative    None Social Determinants of Health     Financial Resource Strain:     Difficulty of Paying Living Expenses:    Food Insecurity:     Worried About Running Out of Food in the Last Year:     [address] in the Last Year:    Transportation Needs:     Lack of Transportation (Medical):  Lack of Transportation (Non-Medical):    Physical Activity:     Days of Exercise per Week:     Minutes of Exercise per Session:    Stress:     Feeling of Stress :    Social Connections:     Frequency of Communication with Friends and Family:     Frequency of Social Gatherings with Friends and Family:     Attends Mosque Services:     Active Member of Clubs or Organizations:     Attends Club or Organization Meetings:     Marital Status:    Intimate Partner Violence:     Fear of Current or Ex-Partner:     Emotionally Abused:     Physically Abused:     Sexually Abused:        SCREENINGS             PHYSICAL EXAM    (up to 7 for level 4, 8 or more for level 5)     ED Triage Vitals [05/25/21 1614]   BP Temp Temp Source Pulse Resp SpO2 Height Weight   134/84 98.4 °F (36.9 °C) Oral 92 16 97 % 5' 7\" (1.702 m) 156 lb (70.8 kg)       Physical Exam  Constitutional:       General: She is not in acute distress. Appearance: She is well-developed. HENT:      Head: Normocephalic and atraumatic. Eyes:      Conjunctiva/sclera: Conjunctivae normal.   Neck:      Vascular: No JVD. Cardiovascular:      Rate and Rhythm: Normal rate and regular rhythm. Pulmonary:      Effort: Pulmonary effort is normal. No respiratory distress. Breath sounds: No stridor. Abdominal:      General: There is no distension. Palpations: Abdomen is soft. Musculoskeletal:         General: Normal range of motion. Cervical back: Normal range of motion and neck supple. Skin:     General: Skin is warm and dry. Neurological:      General: No focal deficit present.       Mental Status: She is alert and oriented to person, place, and time. Mental status is at baseline. Sensory: No sensory deficit. Motor: No weakness. Coordination: Coordination normal.      Gait: Gait normal.         DIAGNOSTIC RESULTS     EKG: All EKG's are interpreted by the Emergency Department Physician who either signs orCo-signs this chart in the absence of a cardiologist.      RADIOLOGY:   Non-plainfilm images such as CT, Ultrasound and MRI are read by the radiologist. Plain radiographic images are visualized and preliminarily interpreted by the emergency physician with the below findings:      Interpretationper the Radiologist below, if available at the time of this note:    No orders to display         ED BEDSIDE ULTRASOUND:   Performed by ED Physician - none    LABS:  Labs Reviewed - No data to display    All other labs were within normal range or not returned as of this dictation. EMERGENCY DEPARTMENT COURSE and DIFFERENTIAL DIAGNOSIS/MDM:   Vitals:    Vitals:    05/25/21 1614   BP: 134/84   Pulse: 92   Resp: 16   Temp: 98.4 °F (36.9 °C)   TempSrc: Oral   SpO2: 97%   Weight: 156 lb (70.8 kg)   Height: 5' 7\" (1.702 m)         MDM  Number of Diagnoses or Management Options  Acute exacerbation of chronic low back pain  Diagnosis management comments: Patient with chronic low back pain, no red flag signs however just felt worse today. Unclear what she is here seeking but she was amenable to muscle relaxers, lidocaine patch, stretching, ice and heat, follow-up with Ortho as scheduled. Give her return precautions and PCP follow-up on Friday. Procedures    FINAL IMPRESSION      1.  Acute exacerbation of chronic low back pain        DISPOSITION/PLAN   DISPOSITION Decision To Discharge 05/25/2021 05:06:58 PM      PATIENT REFERRED TO:  Ayo Vaz MD  [address] [place], [place] [address] [id number]    In 1 week  As needed, If symptoms worsen      DISCHARGE MEDICATIONS:  New Prescriptions    IBUPROFEN (ADVIL;MOTRIN) 200 MG TABLET    Take 3 tablets by mouth every 8 hours as needed for Pain or Fever    LIDOCAINE (LIDODERM) 5 %    Place 1 patch onto the skin daily for 10 days 12 hours on, 12 hours off.    TIZANIDINE (ZANAFLEX) 2 MG TABLET    Take 1 tablet by mouth nightly as needed (back spasms or pain)              Summation      Patient Course:      ED Medications administered this visit:  Medications - No data to display    New Prescriptions from this visit:    New Prescriptions    IBUPROFEN (ADVIL;MOTRIN) 200 MG TABLET    Take 3 tablets by mouth every 8 hours as needed for Pain or Fever    LIDOCAINE (LIDODERM) 5 %    Place 1 patch onto the skin daily for 10 days 12 hours on, 12 hours off.    TIZANIDINE (ZANAFLEX) 2 MG TABLET    Take 1 tablet by mouth nightly as needed (back spasms or pain)       Follow-up:  Gurdeep Pascual MD  [facility]  [address]  [phone]    In 1 week  As needed, If symptoms worsen        Final Impression:   1.  Acute exacerbation of chronic low back pain               (Please note that portions of this note were completed with a voice recognition program.  Efforts were made to edit the dictations but occasionally words are mis-transcribed.)             Virgen Menard MD  05/25/21 0248

## 2021-05-25 NOTE — TELEPHONE ENCOUNTER
Will wait for the records  Also informed the patient that I need to document in the chart before I order an MRI so we will do it after the examining her in [place]

## 2021-05-27 ENCOUNTER — OFFICE VISIT (OUTPATIENT)
Dept: INTERNAL MEDICINE CLINIC | Age: 66
End: 2021-05-27
Payer: COMMERCIAL

## 2021-05-27 VITALS
BODY MASS INDEX: 25.93 KG/M2 | RESPIRATION RATE: 16 BRPM | HEIGHT: 67 IN | DIASTOLIC BLOOD PRESSURE: 76 MMHG | WEIGHT: 165.2 LBS | HEART RATE: 76 BPM | TEMPERATURE: 98.2 F | SYSTOLIC BLOOD PRESSURE: 126 MMHG

## 2021-05-27 DIAGNOSIS — R29.898 LEFT LEG WEAKNESS: ICD-10-CM

## 2021-05-27 DIAGNOSIS — M79.605 PAIN IN LEFT LEG: ICD-10-CM

## 2021-05-27 DIAGNOSIS — M54.9 INTRACTABLE BACK PAIN: Primary | ICD-10-CM

## 2021-05-27 PROCEDURE — 99214 OFFICE O/P EST MOD 30 MIN: CPT | Performed by: INTERNAL MEDICINE

## 2021-05-27 RX ORDER — METHYLPREDNISOLONE 4 MG/1
TABLET ORAL
Qty: 1 KIT | Refills: 0 | Status: SHIPPED | OUTPATIENT
Start: 2021-05-27 | End: 2021-06-02

## 2021-05-27 SDOH — ECONOMIC STABILITY: FOOD INSECURITY: WITHIN THE PAST 12 MONTHS, THE FOOD YOU BOUGHT JUST DIDN'T LAST AND YOU DIDN'T HAVE MONEY TO GET MORE.: NEVER TRUE

## 2021-05-27 NOTE — PROGRESS NOTES
Arturo Lopez is a 60 y.o. female who presents for   Chief Complaint   Patient presents with    Back Pain     6 week this episode after several long car rides- 2 weeks ago got severe, saw chiro, did Xrays, several treatments and no improvement. difficult to sit. radiating pain down left leg. went to [facility] 2 days ago- pt aid nothing was done    and follow up of chronic medical problems. Patient Active Problem List   Diagnosis    IBS (irritable bowel syndrome)    Panic disorder    Chest pain     HPI  Here for follow-up from the emergency room visit and patient having back pain issues and having left leg pain going on for 6 weeks but worse in the last 10 days and patient states that she was walking around 5 miles and then came down to 3 miles and now she cannot even walk 1 mile because the leg hurts and patient states her pain is worse when she sits and little better when she lies down or stands up and denies any injury and patient's pain is radiating down the left leg and patient had x-rays done by the chiropractor results not available at this time and patient was given tizanidine by ER but patient did not started    Current Outpatient Medications   Medication Sig Dispense Refill    methylPREDNISolone (MEDROL DOSEPACK) 4 MG tablet Take by mouth. 1 kit 0    ibuprofen (ADVIL;MOTRIN) 200 MG tablet Take 3 tablets by mouth every 8 hours as needed for Pain or Fever 30 tablet 0    lidocaine (LIDODERM) 5 % Place 1 patch onto the skin daily for 10 days 12 hours on, 12 hours off.  10 patch 0    Multiple Vitamins-Minerals (THERAPEUTIC MULTIVITAMIN-MINERALS) tablet Take 1 tablet by mouth daily      Ascorbic Acid (VITAMIN C) 250 MG tablet Take 250 mg by mouth daily      ELDERBERRY PO Take by mouth      Coenzyme Q10 (CO Q 10 PO) Take by mouth      Cholecalciferol (VITAMIN D3) 30 MCG/15ML LIQD Take by mouth      atorvastatin (LIPITOR) 10 MG tablet TAKE ONE TABLET BY MOUTH TWICE A WEEK 22 tablet 0    beclomethasone (QVAR) 80 MCG/ACT inhaler INHALE 1 PUFF ORALLY TWICE DAILY 16 Inhaler 5    fluticasone (FLONASE) 50 MCG/ACT nasal spray USE 1 SPRAY NASALLY DAILY 1 Bottle 5    Cetirizine HCl (ZYRTEC ALLERGY PO) Take 1 tablet by mouth daily      tiZANidine (ZANAFLEX) 2 MG tablet Take 1 tablet by mouth nightly as needed (back spasms or pain) (Patient not taking: Reported on 5/27/2021) 10 tablet 0     No current facility-administered medications for this visit.        Allergies   Allergen Reactions    Pcn [Penicillins]     Sulfa Antibiotics        Past Medical History:   Diagnosis Date    Asthma     IBS (irritable bowel syndrome)     Panic disorder        Past Surgical History:   Procedure Laterality Date    CYST INCISION AND DRAINAGE Right 11/1997    NASAL POLYP SURGERY         Family History   Problem Relation Age of Onset    High Blood Pressure Mother     High Blood Pressure Father     Cancer Father         melanoma    Heart Disease Maternal Grandfather      ROS   Constitutional:  Negative for fatigue, loss of appetite and unexpected weight change   HEENT            : Negative for neck stiffness and pain, no congestion or sinus pressure   Eyes                : No visual disturbance or pain   Cardiovascular: No chest pain or palpitations or leg swelling   Respiratory      : Negative for cough, shortness of breath or wheezing   Gastrointestinal: Negative for abdominal pain, constipation or diarrhea and bloating No nausea or vomiting   Genitourinary:     No urgency or frequency, no burning or hematuria   Musculoskeletal: Positive for arthralgias, back pain or myalgias   Skin                  : Negative for rash or erythema   Neurological    : Negative for dizziness, weakness, tremors ,light headedness or syncope   Psychiatric       : Negative for dysphoric mood, sleep disturbances, nervous or anxious, or decreased concentration   All other review of systems was negative    Objective  Physical 11/16/2020    HGB 12.8 11/16/2020    HCT 37.2 11/16/2020    MCV 96 11/16/2020     11/16/2020     Lab Results   Component Value Date    INR 0.9 01/03/2018    PROTIME 9.7 01/03/2018     Lab Results   Component Value Date    GLUCOSE 89 11/16/2020    CREATININE 0.58 11/16/2020    BUN 13 11/16/2020     11/16/2020    K 4.3 11/16/2020     11/16/2020    CO2 24 11/16/2020     Lab Results   Component Value Date    ALT 21 11/16/2020    AST 18 11/16/2020    ALKPHOS 67 11/16/2020    BILITOT 0.4 11/16/2020     Lab Results   Component Value Date    LABALBU 4.2 11/16/2020     Lab Results   Component Value Date    TSH 3.4 04/10/2019     Assessment:  1. Intractable back pain    2. Left leg weakness    3. Pain in left leg        Plan:  Patient has been in significant pain and patient's visit to the emergency room and reports reviewed  Patient is started on prednisone pack for 1 week  MRI of the lumbar spine ordered to evaluate  Patient has appointment to see the orthopedics on 24 June and depending on the MRI results patient may need to see the back surgeon  Patient is using the back brace and advised to continue as needed or as tolerated  Review as scheduled           1. Nayeli Esteban received counseling on the following healthy behaviors: nutrition and exercise    2. Prior labs and health maintenance reviewed. 3.  Discussed use, benefit, and side effects of prescribed medications. Barriers to medication compliance addressed. All her questions were answered. Pt voiced understanding. Nayeli Esteban will continue current medications, diet and exercise. Orders Placed This Encounter   Medications    methylPREDNISolone (MEDROL DOSEPACK) 4 MG tablet     Sig: Take by mouth. Dispense:  1 kit     Refill:  0          Completed Refills               Requested Prescriptions     Signed Prescriptions Disp Refills    methylPREDNISolone (MEDROL DOSEPACK) 4 MG tablet 1 kit 0     Sig: Take by mouth.      4. Patient given educational materials - see patient instructions    5. Was a self-tracking handout given in paper form or via [employer]? NO    Orders Placed This Encounter   Procedures    MRI LUMBAR SPINE WO CONTRAST     Standing Status:   Future     Standing Expiration Date:   5/27/2022     Order Specific Question:   Reason for exam:     Answer:   Left leg pain low back pain left leg weakness     No follow-ups on file. Patient voiced understanding and agreed to treatment plan. Electronically signed by Francis Perez MD on 5/27/2021 at 12:11 PM    This note is created with a voice recognition program and while intend to generate a document that accurately reflects the content of the visit, no guarantee can be provided that every mistake has been identified and corrected by editing.

## 2021-06-08 ENCOUNTER — TELEPHONE (OUTPATIENT)
Dept: INTERNAL MEDICINE CLINIC | Age: 66
End: 2021-06-08

## 2021-06-08 DIAGNOSIS — F41.9 ANXIETY: Primary | ICD-10-CM

## 2021-06-08 PROCEDURE — 72148 MRI LUMBAR SPINE W/O DYE: CPT

## 2021-06-08 NOTE — TELEPHONE ENCOUNTER
Patient should be having Xanax that was given in the past at home and she can take 2 Xanax half an hour before the procedure and get the MRI done

## 2021-06-08 NOTE — TELEPHONE ENCOUNTER
Patient could not go through with MRI today asking for a sedative or if a different procedure/test can be done

## 2021-06-08 NOTE — TELEPHONE ENCOUNTER
Spoke to pt- she said she does NOT have any Xanax remaining  Please advise- she is afraid her anxiety will be worse now that she tried it once.

## 2021-06-09 RX ORDER — LORAZEPAM 0.5 MG/1
0.5 TABLET ORAL ONCE
Qty: 1 TABLET | Refills: 0 | OUTPATIENT
Start: 2021-06-09 | End: 2021-06-09

## 2021-06-09 RX ORDER — LORAZEPAM 0.5 MG/1
TABLET ORAL
Qty: 12 TABLET | Refills: 0 | Status: SHIPPED
Start: 2021-06-09 | End: 2021-06-09 | Stop reason: CLARIF

## 2021-06-09 NOTE — TELEPHONE ENCOUNTER
Lorazepam printed with incorrect quantity  Cancelled script  Phoned to [facility] quantity of 1

## 2021-06-14 ENCOUNTER — TELEPHONE (OUTPATIENT)
Dept: INTERNAL MEDICINE CLINIC | Age: 66
End: 2021-06-14

## 2021-06-14 DIAGNOSIS — M54.9 INTRACTABLE BACK PAIN: Primary | ICD-10-CM

## 2021-06-14 RX ORDER — TRAMADOL HYDROCHLORIDE 50 MG/1
50 TABLET ORAL EVERY 6 HOURS PRN
Qty: 12 TABLET | Refills: 0 | Status: SHIPPED | OUTPATIENT
Start: 2021-06-14 | End: 2021-06-17

## 2021-06-14 NOTE — TELEPHONE ENCOUNTER
She is getting the MRI done on 16th  Can try tramadol for a short course until the MRI is done and see me on Thursday or Friday after the MRI

## 2021-06-14 NOTE — TELEPHONE ENCOUNTER
Patient calling stats that the Motrin and the zanaflex is not working  For the back pain asking what shall she do

## 2021-06-14 NOTE — TELEPHONE ENCOUNTER
Patient states she is unable stand more than 45 seconds and walking maybe 5 min; extreme and weakness and pain going since Friday evening    Also wanting to know is she ok to take the tramadol and lorazepam the day of the MRI

## 2021-06-14 NOTE — TELEPHONE ENCOUNTER
Yes but try to avoid to take at the same time and if pain is so bad patient advised to go to the emergency room unless controlled with tramadol

## 2021-06-15 ENCOUNTER — TELEPHONE (OUTPATIENT)
Dept: INTERNAL MEDICINE CLINIC | Age: 66
End: 2021-06-15

## 2021-06-15 NOTE — TELEPHONE ENCOUNTER
Patient notified   She is asking how many mg of the ibuprofen should she take daily and how often she should take

## 2021-06-16 ENCOUNTER — HOSPITAL ENCOUNTER (OUTPATIENT)
Dept: MRI IMAGING | Facility: CLINIC | Age: 66
Discharge: HOME | End: 2021-06-10
Payer: COMMERCIAL

## 2021-06-16 DIAGNOSIS — M54.9 INTRACTABLE BACK PAIN: ICD-10-CM

## 2021-06-16 DIAGNOSIS — R29.898 LEFT LEG WEAKNESS: ICD-10-CM

## 2021-06-17 ENCOUNTER — TELEPHONE (OUTPATIENT)
Dept: INTERNAL MEDICINE CLINIC | Age: 66
End: 2021-06-17

## 2021-06-17 NOTE — TELEPHONE ENCOUNTER
There is a disc extrusion at L4-L5 and needs to see back surgery and please make an appointment as soon as possible

## 2021-06-17 NOTE — TELEPHONE ENCOUNTER
Informed patient of results  Patient states she is out of pain medication   And is asking for a call back from  to discuss MRI

## 2021-06-18 ENCOUNTER — OFFICE VISIT (OUTPATIENT)
Dept: INTERNAL MEDICINE CLINIC | Age: 66
End: 2021-06-18
Payer: COMMERCIAL

## 2021-06-18 VITALS
BODY MASS INDEX: 25.8 KG/M2 | DIASTOLIC BLOOD PRESSURE: 60 MMHG | WEIGHT: 164.4 LBS | TEMPERATURE: 97 F | OXYGEN SATURATION: 98 % | HEART RATE: 72 BPM | HEIGHT: 67 IN | SYSTOLIC BLOOD PRESSURE: 100 MMHG | RESPIRATION RATE: 18 BRPM

## 2021-06-18 DIAGNOSIS — M51.26 PROTRUSION OF LUMBAR INTERVERTEBRAL DISC: ICD-10-CM

## 2021-06-18 DIAGNOSIS — R29.898 LEFT LEG WEAKNESS: ICD-10-CM

## 2021-06-18 DIAGNOSIS — M54.9 INTRACTABLE BACK PAIN: Primary | ICD-10-CM

## 2021-06-18 DIAGNOSIS — M79.605 PAIN IN LEFT LEG: ICD-10-CM

## 2021-06-18 PROCEDURE — 99215 OFFICE O/P EST HI 40 MIN: CPT | Performed by: INTERNAL MEDICINE

## 2021-06-18 RX ORDER — PREDNISONE 10 MG/1
10 TABLET ORAL
Qty: 15 TABLET | Refills: 0 | Status: SHIPPED | OUTPATIENT
Start: 2021-06-18 | End: 2021-06-23

## 2021-06-18 RX ORDER — TIZANIDINE 4 MG/1
4 TABLET ORAL 2 TIMES DAILY
Qty: 20 TABLET | Refills: 0 | Status: SHIPPED | OUTPATIENT
Start: 2021-06-18 | End: 2021-07-09 | Stop reason: SDUPTHER

## 2021-06-18 RX ORDER — OXYCODONE HYDROCHLORIDE AND ACETAMINOPHEN 5; 325 MG/1; MG/1
1 TABLET ORAL EVERY 8 HOURS PRN
Qty: 15 TABLET | Refills: 0 | Status: SHIPPED | OUTPATIENT
Start: 2021-06-18 | End: 2021-06-23

## 2021-06-18 ASSESSMENT — PATIENT HEALTH QUESTIONNAIRE - PHQ9
SUM OF ALL RESPONSES TO PHQ QUESTIONS 1-9: 0
SUM OF ALL RESPONSES TO PHQ QUESTIONS 1-9: 0
SUM OF ALL RESPONSES TO PHQ9 QUESTIONS 1 & 2: 0
SUM OF ALL RESPONSES TO PHQ QUESTIONS 1-9: 0
1. LITTLE INTEREST OR PLEASURE IN DOING THINGS: 0
2. FEELING DOWN, DEPRESSED OR HOPELESS: 0

## 2021-06-18 NOTE — PROGRESS NOTES
Jayant Alex is a 60 y.o. female who presents for   Chief Complaint   Patient presents with    Back Pain     discuss medications, MRI what the next steps should be    Health Maintenance     hep c, hiv, shingles, cervical, covid    and follow up of chronic medical problems. Patient Active Problem List   Diagnosis    IBS (irritable bowel syndrome)    Panic disorder    Chest pain     HPI  Here for follow-up on the MRI and patient had been having pain still getting worse and tramadol did not help her much and she ran out of the tramadol and did not last more than 2 hours each time and patient is taking ibuprofen and according to the patient the pain started in April when she was driving down to [place] and patient since then was seeing her chiropractor whom she has been seeing for the last 25 years and patient did not improve and subsequently patient had x-rays done which revealed mild arthritis and patient was seen by me in May after a month and then patient's pain did not improve even on prednisone and patient was advised to get an MRI which was done 2 days back and patient is here for follow-up I will discuss about the options and also requesting pain medications    Current Outpatient Medications   Medication Sig Dispense Refill    predniSONE (DELTASONE) 10 MG tablet Take 1 tablet by mouth 3 times daily (with meals) for 5 days 15 tablet 0    oxyCODONE-acetaminophen (PERCOCET) 5-325 MG per tablet Take 1 tablet by mouth every 8 hours as needed for Pain for up to 5 days. Intended supply: 3 days.  Take lowest dose possible to manage pain 15 tablet 0    tiZANidine (ZANAFLEX) 4 MG tablet Take 1 tablet by mouth 2 times daily 20 tablet 0    ibuprofen (ADVIL;MOTRIN) 200 MG tablet Take 3 tablets by mouth every 8 hours as needed for Pain or Fever 30 tablet 0    Multiple Vitamins-Minerals (THERAPEUTIC MULTIVITAMIN-MINERALS) tablet Take 1 tablet by mouth daily      Ascorbic Acid (VITAMIN C) 250 MG tablet Take 250 mg by mouth daily      Coenzyme Q10 (CO Q 10 PO) Take by mouth      Cholecalciferol (VITAMIN D3) 30 MCG/15ML LIQD Take by mouth      atorvastatin (LIPITOR) 10 MG tablet TAKE ONE TABLET BY MOUTH TWICE A WEEK 22 tablet 0    beclomethasone (QVAR) 80 MCG/ACT inhaler INHALE 1 PUFF ORALLY TWICE DAILY 16 Inhaler 5    fluticasone (FLONASE) 50 MCG/ACT nasal spray USE 1 SPRAY NASALLY DAILY 1 Bottle 5    Cetirizine HCl (ZYRTEC ALLERGY PO) Take 1 tablet by mouth daily      tiZANidine (ZANAFLEX) 2 MG tablet Take 1 tablet by mouth nightly as needed (back spasms or pain) (Patient not taking: Reported on 5/27/2021) 10 tablet 0     No current facility-administered medications for this visit.        Allergies   Allergen Reactions    Pcn [Penicillins]     Sulfa Antibiotics        Past Medical History:   Diagnosis Date    Asthma     IBS (irritable bowel syndrome)     Panic disorder        Past Surgical History:   Procedure Laterality Date    CYST INCISION AND DRAINAGE Right 11/1997    NASAL POLYP SURGERY         Family History   Problem Relation Age of Onset    High Blood Pressure Mother     High Blood Pressure Father     Cancer Father         melanoma    Heart Disease Maternal Grandfather      ROS   Constitutional:  Negative for fatigue, loss of appetite and unexpected weight change   HEENT            : Negative for neck stiffness and pain, no congestion or sinus pressure   Eyes                : No visual disturbance or pain   Cardiovascular: No chest pain or palpitations or leg swelling   Respiratory      : Negative for cough, shortness of breath or wheezing   Gastrointestinal: Negative for abdominal pain, constipation or diarrhea and bloating No nausea or vomiting   Genitourinary:     No urgency or frequency, no burning or hematuria   Musculoskeletal: Positive for arthralgias, back pain or myalgias and also patient complains of a burning sensation and pain in the left leg above the ankle and also in the foot   Skin                  : Negative for rash or erythema   Neurological    : Negative for dizziness, weakness, tremors ,light headedness or syncope   Psychiatric       : Negative for dysphoric mood, sleep disturbances, nervous or anxious, or decreased concentration   All other review of systems was negative    Objective  Physical Examination:    Nursing note reviewed    /60 (Site: Left Upper Arm, Position: Sitting, Cuff Size: Medium Adult)   Pulse 72   Temp 97 °F (36.1 °C) (Temporal)   Resp 18   Ht 5' 7.01\" (1.702 m)   Wt 164 lb 6.4 oz (74.6 kg)   LMP 01/01/2001 (LMP Unknown)   SpO2 98%   Breastfeeding No   BMI 25.74 kg/m²   BP Readings from Last 3 Encounters:   06/18/21 100/60   05/27/21 126/76   05/25/21 134/84         Constitutional:  Ronald Solares is oriented to place, person and time ,appears well-developed and well-nourished  HEENT:  Atraumatic and normocephalic, external ears normal bilaterally, nose normal no oropharyngeal exudate and is clear and moist  Eyes:  EOCM normal; conjunctivae normal; PERRLA bilaterally  Neck:  Normal range of motion, neck supple, no JVD and no thyromegaly  Cardiovascular:  RRR, normal heart sounds and intact distal pulses  Pulmonary:  effort normal and breath sounds normal bilaterally,no wheezes or rales, no respiratory distress  Abdominal:  Soft, non-tender; normal bowel sounds, no masses  Musculoskeletal: Limited range of motion and no edema or tenderness bilaterally and patient has difficulty even lying down or sitting or standing in any posture and the pain shooting down her leg all the way down and up to her ankle and sometimes more knee pain in the leg area and leg raising test was positive at 40 degrees on the left side and there is decreased strength and 1/5 on the left and dorsiflexion is limited on the left side compared to the right and right-sided strength was 3 or 5 but when she was trying to resist and lift her right leg her pain got intensified on the left side  No lymphadenopathy  Neurological:  alert, oriented, and normal reflexes bilaterally  Skin: warm and dry  Psychiatric:  normal mood and effect; behavior normal.    Labs:   No results found for: LABA1C  Lab Results   Component Value Date    CHOL 206 11/16/2020     Lab Results   Component Value Date    HDL 63 11/16/2020     Lab Results   Component Value Date    LDLCALC 128 11/16/2020     Lab Results   Component Value Date    TRIG 82 11/16/2020     No components found for: CHOLHDL  Lab Results   Component Value Date    WBC 6.2 11/16/2020    HGB 12.8 11/16/2020    HCT 37.2 11/16/2020    MCV 96 11/16/2020     11/16/2020     Lab Results   Component Value Date    INR 0.9 01/03/2018    PROTIME 9.7 01/03/2018     Lab Results   Component Value Date    GLUCOSE 89 11/16/2020    CREATININE 0.58 11/16/2020    BUN 13 11/16/2020     11/16/2020    K 4.3 11/16/2020     11/16/2020    CO2 24 11/16/2020     Lab Results   Component Value Date    ALT 21 11/16/2020    AST 18 11/16/2020    ALKPHOS 67 11/16/2020    BILITOT 0.4 11/16/2020     Lab Results   Component Value Date    LABALBU 4.2 11/16/2020     Lab Results   Component Value Date    TSH 3.4 04/10/2019     Assessment:  1. Intractable back pain    2. Protrusion of lumbar intervertebral disc    3. Left leg weakness    4.  Pain in left leg        Plan:  Patient's MRI reviewed in detail and explained to her about the findings and also corresponding symptomatology and a copy of the MRI was given to the patient  Patient was started on tramadol but patient states it did not help her much with her pain and so patient advised to start on Percocet and a new prescription was given and counseled about pain medications and the narc agreement was signed and also patient was started on Zanaflex 4 mg twice daily along with prednisone pack 10 mg 3 times a day for 5 days to relieve the inflammation  Patient's MRI showed L4-L5 disc extrusion and also there is arthritis all over the lumbar spine with osteophytes and mild spinal stenosis  I also called the the back surgeon while patient was here in the office and discussed with him and he kindly agreed to see the patient on Tuesday at 12:20 PM and patient was informed  Total time in discussion examination review of reports and coordination of care more than 45 minutes  Review in 2 weeks           1. Elma Durbin received counseling on the following healthy behaviors: nutrition and exercise    2. Prior labs and health maintenance reviewed. 3.  Discussed use, benefit, and side effects of prescribed medications. Barriers to medication compliance addressed. All her questions were answered. Pt voiced understanding. Elma Durbin will continue current medications, diet and exercise. Orders Placed This Encounter   Medications    predniSONE (DELTASONE) 10 MG tablet     Sig: Take 1 tablet by mouth 3 times daily (with meals) for 5 days     Dispense:  15 tablet     Refill:  0    oxyCODONE-acetaminophen (PERCOCET) 5-325 MG per tablet     Sig: Take 1 tablet by mouth every 8 hours as needed for Pain for up to 5 days. Intended supply: 3 days. Take lowest dose possible to manage pain     Dispense:  15 tablet     Refill:  0     Reduce doses taken as pain becomes manageable    tiZANidine (ZANAFLEX) 4 MG tablet     Sig: Take 1 tablet by mouth 2 times daily     Dispense:  20 tablet     Refill:  0          Completed Refills               Requested Prescriptions     Signed Prescriptions Disp Refills    predniSONE (DELTASONE) 10 MG tablet 15 tablet 0     Sig: Take 1 tablet by mouth 3 times daily (with meals) for 5 days    oxyCODONE-acetaminophen (PERCOCET) 5-325 MG per tablet 15 tablet 0     Sig: Take 1 tablet by mouth every 8 hours as needed for Pain for up to 5 days. Intended supply: 3 days. Take lowest dose possible to manage pain    tiZANidine (ZANAFLEX) 4 MG tablet 20 tablet 0     Sig: Take 1 tablet by mouth 2 times daily     4.  Patient given

## 2021-06-30 ENCOUNTER — TELEPHONE (OUTPATIENT)
Dept: INTERNAL MEDICINE CLINIC | Age: 66
End: 2021-06-30

## 2021-06-30 NOTE — TELEPHONE ENCOUNTER
----- Message from Jarrett Saravia sent at 6/30/2021  1:26 PM EDT -----  Subject: Message to Provider    QUESTIONS  Information for Provider? Spoke with patient, she states that she is   seeing Dr. Sam and also is doing PT. She states that at this   time she does not need to see Dr. Jacqueline Pathak .   ---------------------------------------------------------------------------  --------------  Ludwin Fent INFO  What is the best way for the office to contact you? OK to leave message on   voicemail  Preferred Call Back Phone Number? [phone]  ---------------------------------------------------------------------------  --------------  SCRIPT ANSWERS  Relationship to Patient?  Self

## 2021-07-07 RX ORDER — TIZANIDINE 2 MG/1
2 TABLET ORAL
Refills: 0 | OUTPATIENT
Start: 2021-07-07

## 2021-07-07 NOTE — TELEPHONE ENCOUNTER
Last week she mentioned that she has no need to see me because she is following with the orthopedic back surgeon and now she is requesting pain medications and muscle relaxers and patient need to be seen in the office before filling up any prescriptions and make an appointment or if a slot is available in the next few days otherwise I will see her next week

## 2021-07-07 NOTE — TELEPHONE ENCOUNTER
Christina Dennis is calling to request a refill on the following medication(s):    Medication Request:  Requested Prescriptions     Pending Prescriptions Disp Refills    tiZANidine (ZANAFLEX) 2 MG tablet  0     Si tablet       Last Visit Date (If Applicable):      Next Visit Date:    Visit date not found      Patient states she has been taking Zanaflex 2 mg four times a day by Dr. Emely Liu and he won't refill until she has surgery. She is currently doing PT. Patient also requesting a refill on Oxycodone 5-325mg taking one tablet every 8 hours as needed.

## 2021-07-09 ENCOUNTER — OFFICE VISIT (OUTPATIENT)
Dept: INTERNAL MEDICINE CLINIC | Age: 66
End: 2021-07-09
Payer: COMMERCIAL

## 2021-07-09 VITALS
TEMPERATURE: 98.2 F | OXYGEN SATURATION: 98 % | HEART RATE: 70 BPM | WEIGHT: 164 LBS | DIASTOLIC BLOOD PRESSURE: 72 MMHG | HEIGHT: 67 IN | BODY MASS INDEX: 25.74 KG/M2 | RESPIRATION RATE: 18 BRPM | SYSTOLIC BLOOD PRESSURE: 110 MMHG

## 2021-07-09 DIAGNOSIS — M76.32 ILIOTIBIAL BAND SYNDROME OF LEFT SIDE: ICD-10-CM

## 2021-07-09 DIAGNOSIS — M54.9 INTRACTABLE BACK PAIN: Primary | ICD-10-CM

## 2021-07-09 DIAGNOSIS — M79.605 PAIN IN LEFT LEG: ICD-10-CM

## 2021-07-09 PROCEDURE — 99214 OFFICE O/P EST MOD 30 MIN: CPT | Performed by: INTERNAL MEDICINE

## 2021-07-09 RX ORDER — TRAMADOL HYDROCHLORIDE 50 MG/1
50 TABLET ORAL EVERY 8 HOURS PRN
Qty: 30 TABLET | Refills: 0 | Status: SHIPPED | OUTPATIENT
Start: 2021-07-09 | End: 2021-07-19

## 2021-07-09 RX ORDER — TIZANIDINE 4 MG/1
4 TABLET ORAL 2 TIMES DAILY
Qty: 40 TABLET | Refills: 0 | Status: SHIPPED | OUTPATIENT
Start: 2021-07-09 | End: 2021-07-29

## 2021-07-09 RX ORDER — TRAMADOL HYDROCHLORIDE 50 MG/1
TABLET ORAL
COMMUNITY
Start: 2021-07-01 | End: 2021-07-09 | Stop reason: SDUPTHER

## 2021-07-09 RX ORDER — GABAPENTIN 300 MG/1
300 CAPSULE ORAL 3 TIMES DAILY
Qty: 90 CAPSULE | Refills: 0 | Status: SHIPPED | OUTPATIENT
Start: 2021-07-09 | End: 2021-08-16

## 2021-07-09 RX ORDER — GABAPENTIN 300 MG/1
300 CAPSULE ORAL
COMMUNITY
Start: 2021-06-30 | End: 2021-07-09

## 2021-07-09 ASSESSMENT — PATIENT HEALTH QUESTIONNAIRE - PHQ9
SUM OF ALL RESPONSES TO PHQ QUESTIONS 1-9: 0
1. LITTLE INTEREST OR PLEASURE IN DOING THINGS: 0
SUM OF ALL RESPONSES TO PHQ QUESTIONS 1-9: 0
SUM OF ALL RESPONSES TO PHQ9 QUESTIONS 1 & 2: 0
2. FEELING DOWN, DEPRESSED OR HOPELESS: 0
SUM OF ALL RESPONSES TO PHQ QUESTIONS 1-9: 0

## 2021-07-09 NOTE — PROGRESS NOTES
Will Noriega is a 60 y.o. female who presents for   Chief Complaint   Patient presents with    Discuss Medications     discuss pain medication; almost out of gabapentin and trazodone    Health Maintenance     hep c, hiv, shingles, cervical, covid    Medication Refill     inhaler    and follow up of chronic medical problems. Patient Active Problem List   Diagnosis    IBS (irritable bowel syndrome)    Panic disorder    Chest pain     HPI  Here for follow-up on back pain and needs refills on pain medications    Current Outpatient Medications   Medication Sig Dispense Refill    beclomethasone (QVAR) 80 MCG/ACT inhaler INHALE 1 PUFF ORALLY TWICE DAILY 16 Inhaler 5    gabapentin (NEURONTIN) 300 MG capsule Take 1 capsule by mouth 3 times daily for 90 days. 90 capsule 0    tiZANidine (ZANAFLEX) 4 MG tablet Take 1 tablet by mouth 2 times daily for 20 days 40 tablet 0    traMADol (ULTRAM) 50 MG tablet Take 1 tablet by mouth every 8 hours as needed for Pain for up to 10 days. 30 tablet 0    ibuprofen (ADVIL;MOTRIN) 200 MG tablet Take 3 tablets by mouth every 8 hours as needed for Pain or Fever 30 tablet 0    Multiple Vitamins-Minerals (THERAPEUTIC MULTIVITAMIN-MINERALS) tablet Take 1 tablet by mouth daily      Ascorbic Acid (VITAMIN C) 250 MG tablet Take 250 mg by mouth daily      Coenzyme Q10 (CO Q 10 PO) Take by mouth      Cholecalciferol (VITAMIN D3) 30 MCG/15ML LIQD Take by mouth      atorvastatin (LIPITOR) 10 MG tablet TAKE ONE TABLET BY MOUTH TWICE A WEEK 22 tablet 0    fluticasone (FLONASE) 50 MCG/ACT nasal spray USE 1 SPRAY NASALLY DAILY 1 Bottle 5    Cetirizine HCl (ZYRTEC ALLERGY PO) Take 1 tablet by mouth daily      tiZANidine (ZANAFLEX) 2 MG tablet Take 1 tablet by mouth nightly as needed (back spasms or pain) (Patient not taking: Reported on 5/27/2021) 10 tablet 0     No current facility-administered medications for this visit.        Allergies   Allergen Reactions    Pcn [Penicillins]     Sulfa Antibiotics        Past Medical History:   Diagnosis Date    Asthma     IBS (irritable bowel syndrome)     Panic disorder        Past Surgical History:   Procedure Laterality Date    CYST INCISION AND DRAINAGE Right 11/1997    NASAL POLYP SURGERY         Family History   Problem Relation Age of Onset    High Blood Pressure Mother     High Blood Pressure Father     Cancer Father         melanoma    Heart Disease Maternal Grandfather      ROS   Constitutional:  Negative for fatigue, loss of appetite and unexpected weight change   HEENT            : Negative for neck stiffness and pain, no congestion or sinus pressure   Eyes                : No visual disturbance or pain   Cardiovascular: No chest pain or palpitations or leg swelling   Respiratory      : Negative for cough, shortness of breath or wheezing   Gastrointestinal: Negative for abdominal pain, constipation or diarrhea and bloating No nausea or vomiting   Genitourinary:     No urgency or frequency, no burning or hematuria   Musculoskeletal: Positive for arthralgias, back pain or myalgias   Skin                  : Negative for rash or erythema   Neurological    : Negative for dizziness, weakness, tremors ,light headedness or syncope   Psychiatric       : Negative for dysphoric mood, sleep disturbances, nervous or anxious, or decreased concentration   All other review of systems was negative    Objective  Physical Examination:    Nursing note reviewed    /72 (Site: Right Upper Arm, Position: Sitting, Cuff Size: Medium Adult)   Pulse 70   Temp 98.2 °F (36.8 °C) (Temporal)   Resp 18   Ht 5' 7\" (1.702 m)   Wt 164 lb (74.4 kg)   LMP 01/01/2001 (LMP Unknown)   SpO2 98%   Breastfeeding No   BMI 25.69 kg/m²   BP Readings from Last 3 Encounters:   07/09/21 110/72   06/18/21 100/60   05/27/21 126/76         Constitutional:  Timothy Ho is oriented to place, person and time ,appears well-developed and well-nourished  HEENT:  Atraumatic for tramadol and Zanaflex by back surgery for 1 week which ended yesterday and so a new prescription was given for 20 days of Zanaflex and 10 days of tramadol and also patient advised to increase the gabapentin to 300 mg 3 times a day for better control of the pain and neuropathy and we also discussed about seeing the pain management and patient states that back surgery is arranging for pain management referral and may take 4 weeks and I said I will try to get appointment sooner if possible and will discuss with back surgery next week and PDMP reviewed  Patient is doing physical therapy currently and will be increasing to 3 times a week from next week and is working on the IT band syndrome  Review in 4 weeks           1. Jelani Mccall received counseling on the following healthy behaviors: nutrition and exercise    2. Prior labs and health maintenance reviewed. 3.  Discussed use, benefit, and side effects of prescribed medications. Barriers to medication compliance addressed. All her questions were answered. Pt voiced understanding. Jelani Mccall will continue current medications, diet and exercise. Orders Placed This Encounter   Medications    beclomethasone (QVAR) 80 MCG/ACT inhaler     Sig: INHALE 1 PUFF ORALLY TWICE DAILY     Dispense:  16 Inhaler     Refill:  5    gabapentin (NEURONTIN) 300 MG capsule     Sig: Take 1 capsule by mouth 3 times daily for 90 days. Dispense:  90 capsule     Refill:  0    tiZANidine (ZANAFLEX) 4 MG tablet     Sig: Take 1 tablet by mouth 2 times daily for 20 days     Dispense:  40 tablet     Refill:  0    traMADol (ULTRAM) 50 MG tablet     Sig: Take 1 tablet by mouth every 8 hours as needed for Pain for up to 10 days.      Dispense:  30 tablet     Refill:  0     Reduce doses taken as pain becomes manageable          Completed Refills               Requested Prescriptions     Signed Prescriptions Disp Refills    beclomethasone (QVAR) 80 MCG/ACT inhaler 16 Inhaler 5 Sig: INHALE 1 PUFF ORALLY TWICE DAILY    gabapentin (NEURONTIN) 300 MG capsule 90 capsule 0     Sig: Take 1 capsule by mouth 3 times daily for 90 days.  tiZANidine (ZANAFLEX) 4 MG tablet 40 tablet 0     Sig: Take 1 tablet by mouth 2 times daily for 20 days    traMADol (ULTRAM) 50 MG tablet 30 tablet 0     Sig: Take 1 tablet by mouth every 8 hours as needed for Pain for up to 10 days. 4. Patient given educational materials - see patient instructions    5. Was a self-tracking handout given in paper form or via Dr. Tariffhart? NO    No orders of the defined types were placed in this encounter. Return in about 4 weeks (around 8/6/2021). Patient voiced understanding and agreed to treatment plan. Electronically signed by Sara Olmedo MD on 7/9/2021 at 10:39 AM    This note is created with a voice recognition program and while intend to generate a document that accurately reflects the content of the visit, no guarantee can be provided that every mistake has been identified and corrected by editing.

## 2021-07-12 ENCOUNTER — TELEPHONE (OUTPATIENT)
Dept: INTERNAL MEDICINE CLINIC | Age: 66
End: 2021-07-12

## 2021-07-12 DIAGNOSIS — M54.9 INTRACTABLE BACK PAIN: Primary | ICD-10-CM

## 2021-07-12 NOTE — TELEPHONE ENCOUNTER
Pt called to report tizanidine and tramadol are not helping back pain. Feels tizanidine was helping when she took one 4 times daily from specialist.  Current rx is for BID PRN. States she has increased gabapentin to 300 mg TID without relief. Has not heard anything about pain management appt as of yet. Pt is asking for a change of medications. Please advise.

## 2021-07-12 NOTE — TELEPHONE ENCOUNTER
Alden Farrar  Can you check with Dr. Harry Cheng office if they have any pain doctor in the mind or we can refer to a different pain doctor please make an appointment with another pain specialist as soon as possible and ask her to increase the tizanidine to 4 times a day

## 2021-07-12 NOTE — TELEPHONE ENCOUNTER
Spoke with 's nurse Liset Reese. States referral to (pain management) was faxed 7/8, however appts are being scheduled 2-3 months out. Yolanda recommended  at [facility], they are booking out 4-6 weeks. Please advise.

## 2021-07-14 ENCOUNTER — INITIAL CONSULT (OUTPATIENT)
Dept: PAIN MANAGEMENT | Age: 66
End: 2021-07-14
Payer: COMMERCIAL

## 2021-07-14 VITALS
SYSTOLIC BLOOD PRESSURE: 111 MMHG | OXYGEN SATURATION: 97 % | HEART RATE: 75 BPM | WEIGHT: 164.4 LBS | RESPIRATION RATE: 15 BRPM | BODY MASS INDEX: 25.8 KG/M2 | DIASTOLIC BLOOD PRESSURE: 74 MMHG | HEIGHT: 67 IN

## 2021-07-14 DIAGNOSIS — M54.16 LEFT LUMBAR RADICULITIS: Primary | ICD-10-CM

## 2021-07-14 DIAGNOSIS — M51.26 LUMBAR DISC HERNIATION: ICD-10-CM

## 2021-07-14 DIAGNOSIS — M51.36 DDD (DEGENERATIVE DISC DISEASE), LUMBAR: ICD-10-CM

## 2021-07-14 PROBLEM — M51.369 DDD (DEGENERATIVE DISC DISEASE), LUMBAR: Status: ACTIVE | Noted: 2021-07-14

## 2021-07-14 PROCEDURE — 99204 OFFICE O/P NEW MOD 45 MIN: CPT | Performed by: ANESTHESIOLOGY

## 2021-07-14 RX ORDER — BECLOMETHASONE DIPROPIONATE HFA 80 UG/1
AEROSOL, METERED RESPIRATORY (INHALATION)
COMMUNITY
Start: 2021-07-09 | End: 2021-11-09 | Stop reason: SDUPTHER

## 2021-07-14 RX ORDER — MELOXICAM 15 MG/1
15 TABLET ORAL DAILY
Qty: 30 TABLET | Refills: 0 | Status: SHIPPED | OUTPATIENT
Start: 2021-07-14 | End: 2021-08-16

## 2021-07-14 ASSESSMENT — ENCOUNTER SYMPTOMS
SINUS PAIN: 1
APNEA: 1
ALLERGIC/IMMUNOLOGIC NEGATIVE: 1
BACK PAIN: 1
EYES NEGATIVE: 1
GASTROINTESTINAL NEGATIVE: 1
SINUS PRESSURE: 1

## 2021-07-14 NOTE — PROGRESS NOTES
The patient is a 60 y. o. Non-/non  female. Chief Complaint   Patient presents with    Consultation    Back Pain        HPI    Requesting physician for the evaluation of Diony Haile 1955: Marquis Padmini MD    Pain History  29-year-old pleasant female with almost 6-month history of back pain  Symptoms started after a long drive to [place]  Her pain is predominantly located in the axial lower lumbar area as aching nagging stiffness that was progressively worsening  6 weeks back she developed severe flareup of her back pain without any particular injury  Now she has developed radiation of pain down left leg over hip thigh and anterior leg  Pain aggravated with lifting bending twisting turning  Nothing seems to alleviate the pain  Described the pain is sharp shooting throbbing burning sensation  No changes in bladder or bowel control    She been doing physical therapy have attended several sessions not noticed too much improvement  Have tried anti-inflammatory medication gabapentin tizanidine and tramadol  Pain is severe markedly disabling affecting quality of life  Had recent MRI lumbar spine    No previous lumbar spine injection history  No previous lumbar spine surgical history  Pain score today 7  1. Location:lower back   2. Radiation:left foot , shin and gluteal pain the worst  3. Character:aching penetrating nagging throbbing sharp  5. Duration:hours  6. Onset:04/04/21   7. Did an injury cause pain: no  8. Aggravating factors:nothing  9. Alleviating factors: medications,ice  10. Associated symptoms (numbness / tingling / weakness):  no  -Where at:n/a  -Down into finger tips or toes (specify which finger or toes):n/a  -constant or intermitting: -  11. Red Flags: (weight loss / chills / loss of bladder or bowel control):no    Previous management history  1.  Previous diagnostic workup: (Imaging/EMG)   CT, MRI, or Xray: MRI  What part of the body:back and hip  What facility did they have it at: [facility]   What year or specific date: 06/2021  EMG:  no    2. Previous non interventional treatments tried:  chiropractor or physical therapy: chiropractor, physical therapy  What part of the body:chiropractor hips,physical therapy lower back and left hip  What facility was it done at: chiropractor- [facility], physical therapy-[facility]  How long ago was it last tried: may 20 chiropractor, physical therapy last visit 7/13/21  Did it work:chiropractor-no, physical therapy-yes  Did they complete it: chiropractor completed physical therapy is current    3. Previous Medications tried  NSAID's: yes  Neurontin: yes  Lyrica: no  Trycyclic antidepressant (Ellavil / Pamelor ): no  Cymbalta: no  Opioids (Ultram / Vicodin / Percocet / Morphine / Dilaudid / Oramorph/ Fentanyl etc.):percocet  Last Pain medication taken (name of med and date):ibprofin, tylenol gabapentin 7/14/21    4. Previous Interventional pain procedures tried:no  What kind of injection:  Who did the injection:  did the injection help:   Last time injection was done:    5.  Previous surgeries for pain: no  What part of the body did they have the surgery:  What physician did the surgery:  What Facility did they have the surgery done:  Date of Surgery:    Social History:  Marital status:    Employment History:not employed  Working  - Full time Or Part time: -  Disability no  Legal Issues related to pain complaint: no    Pain Disability Index score : 68    No results found for: LABA1C  No results found for: EAG      Informant: patient        Past Medical History:   Diagnosis Date    Asthma     DDD (degenerative disc disease), lumbar 7/14/2021    IBS (irritable bowel syndrome)     Left lumbar radiculitis 7/14/2021    Panic disorder       Past Surgical History:   Procedure Laterality Date    CYST INCISION AND DRAINAGE Right 11/1997    NASAL POLYP SURGERY       Social History     Socioeconomic History  Marital status:      Spouse name: None    Number of children: None    Years of education: None    Highest education level: None   Occupational History    None   Tobacco Use    Smoking status: Never Smoker    Smokeless tobacco: Never Used   Substance and Sexual Activity    Alcohol use: Yes     Alcohol/week: 7.0 standard drinks     Types: 7 Glasses of wine per week     Comment: 1-2 a few nights a week    Drug use: No    Sexual activity: None   Other Topics Concern    None   Social History Narrative    None     Social Determinants of Health     Financial Resource Strain: Low Risk     Difficulty of Paying Living Expenses: Not hard at all   Food Insecurity: No Food Insecurity    Worried About Running Out of Food in the Last Year: Never true    Shantel of Food in the Last Year: Never true   Transportation Needs:     Lack of Transportation (Medical):      Lack of Transportation (Non-Medical):    Physical Activity:     Days of Exercise per Week:     Minutes of Exercise per Session:    Stress:     Feeling of Stress :    Social Connections:     Frequency of Communication with Friends and Family:     Frequency of Social Gatherings with Friends and Family:     Attends Adventism Services:     Active Member of Clubs or Organizations:     Attends Club or Organization Meetings:     Marital Status:    Intimate Partner Violence:     Fear of Current or Ex-Partner:     Emotionally Abused:     Physically Abused:     Sexually Abused:      Family History   Problem Relation Age of Onset    High Blood Pressure Mother     High Blood Pressure Father     Cancer Father         melanoma    Heart Disease Maternal Grandfather      Allergies   Allergen Reactions    Pcn [Penicillins]     Sulfa Antibiotics      Pcn [penicillins] and Sulfa antibiotics   Vitals:    07/14/21 1109   BP: 111/74   Pulse: 75   Resp: 15   SpO2: 97%     Current Outpatient Medications   Medication Sig Dispense Refill    Elastic Bandages & Supports (LUMBAR BACK BRACE/SUPPORT PAD) MISC 1 Units by Does not apply route daily Pneumatic offloading back brace 1 each 0    meloxicam (MOBIC) 15 MG tablet Take 1 tablet by mouth daily 30 tablet 0    beclomethasone (QVAR) 80 MCG/ACT inhaler INHALE 1 PUFF ORALLY TWICE DAILY 16 Inhaler 5    gabapentin (NEURONTIN) 300 MG capsule Take 1 capsule by mouth 3 times daily for 90 days. 90 capsule 0    tiZANidine (ZANAFLEX) 4 MG tablet Take 1 tablet by mouth 2 times daily for 20 days 40 tablet 0    traMADol (ULTRAM) 50 MG tablet Take 1 tablet by mouth every 8 hours as needed for Pain for up to 10 days. 30 tablet 0    ibuprofen (ADVIL;MOTRIN) 200 MG tablet Take 3 tablets by mouth every 8 hours as needed for Pain or Fever 30 tablet 0    Multiple Vitamins-Minerals (THERAPEUTIC MULTIVITAMIN-MINERALS) tablet Take 1 tablet by mouth daily      Ascorbic Acid (VITAMIN C) 250 MG tablet Take 250 mg by mouth daily      Coenzyme Q10 (CO Q 10 PO) Take by mouth      Cholecalciferol (VITAMIN D3) 30 MCG/15ML LIQD Take by mouth      fluticasone (FLONASE) 50 MCG/ACT nasal spray USE 1 SPRAY NASALLY DAILY 1 Bottle 5    Cetirizine HCl (ZYRTEC ALLERGY PO) Take 1 tablet by mouth daily      QVAR REDIHALER 80 MCG/ACT AERB inhaler       tiZANidine (ZANAFLEX) 2 MG tablet Take 1 tablet by mouth nightly as needed (back spasms or pain) (Patient not taking: Reported on 7/14/2021) 10 tablet 0    atorvastatin (LIPITOR) 10 MG tablet TAKE ONE TABLET BY MOUTH TWICE A WEEK (Patient not taking: Reported on 7/14/2021) 22 tablet 0     No current facility-administered medications for this visit. Review of Systems   Constitutional: Positive for activity change and appetite change. Negative for fever. Loss of appetite   HENT: Positive for sinus pressure and sinus pain. Eyes: Negative. Respiratory: Positive for apnea. Cardiovascular: Negative. Gastrointestinal: Negative. Endocrine: Negative.     Genitourinary: Negative. Musculoskeletal: Positive for back pain, neck pain and neck stiffness. Skin: Negative. Allergic/Immunologic: Negative. Neurological: Positive for headaches. Hematological: Negative. Psychiatric/Behavioral: Positive for sleep disturbance. Objective:  General Appearance:  Uncomfortable and in pain. Vital signs: (most recent): Blood pressure 111/74, pulse 75, resp. rate 15, height 5' 7\" (1.702 m), weight 164 lb 6.4 oz (74.6 kg), last menstrual period 01/01/2001, SpO2 97 %, not currently breastfeeding. Vital signs are normal.  No fever. Output: Producing urine and producing stool. HEENT: Normal HEENT exam.    Lungs:  Normal effort and normal respiratory rate. Breath sounds clear to auscultation. She is not in respiratory distress. Heart: Normal rate. Abdomen: Abdomen is soft. There is no abdominal tenderness. Extremities: Normal range of motion. There is no deformity. Neurological: Patient is alert and oriented to person, place and time. Normal strength. Patient has abnormal reflexes. Patient has normal coordination. (Right knee reflex 2+, left knee reflex 1+). Pupils:  Pupils are equal, round, and reactive to light. Pupils are equal.   Skin:  Warm and dry. No rash or cyanosis.    Lumbar spine examination  No apparent deformity on inspection  Range of motion is limited and associated with pain  Pain aggravated with forward flexion and left lateral bending  Straight leg raise positive on left side  Gait is slow and antalgic  Assessment & Plan     Pain History  36-year-old pleasant female with almost 6-month history of back pain  Symptoms started after a long drive to [place]  Her pain is predominantly located in the axial lower lumbar area as aching nagging stiffness that was progressively worsening  6 weeks back she developed severe flareup of her back pain without any particular injury  Now she has developed radiation of pain down left leg over hip thigh and anterior leg  Pain aggravated with lifting bending twisting turning  Nothing seems to alleviate the pain  Described the pain is sharp shooting throbbing burning sensation  No changes in bladder or bowel control    She been doing physical therapy have attended several sessions not noticed too much improvement  Have tried anti-inflammatory medication gabapentin tizanidine and tramadol  Pain is severe markedly disabling affecting quality of life  Had recent MRI lumbar spine    No previous lumbar spine injection history  No previous lumbar spine surgical history  Pain score today 7    EXAMINATION: MRI OF THE LUMBAR SPINE WITHOUT CONTRAST, 6/16/2021 1:44 pm    L3-L4: There is a disc bulge measuring 3-4 mm. Disc and/or osteophytes result in moderate narrowing of the neural foramina bilaterally. There is mild facet and ligamentum flavum hypertrophy. The thecal sac is not stenotic. L4-L5: There is a disc extrusion toward the left from L4-5 extending superiorly behind the inferior endplate of L4 and causing severe narrowing of the left neural foramen. The thecal sac is not stenotic. There is mild narrowing of the right neural foramen. L5-S1:  There is mild facet and ligamentum flavum hypertrophy. Disc and/or osteophytes cause minimal narrowing of the neural foramina bilaterally. The thecal sac is not stenotic. The S1 nerve roots are intact. Impression Disc extrusion toward the left at L4-5 extending superiorly behind the inferior endplate of L4 causing severe narrowing of the left neural foramen. Narrowing of the neural foramina throughout the lumbar region as discussed above. No stenosis of the thecal sac in the lumbar region. 1. Left lumbar radiculitis    2. Lumbar disc herniation    3.  DDD (degenerative disc disease), lumbar        Orders Placed This Encounter   Procedures    AR NJX AA&/STRD TFRML EPI LUMBAR/SACRAL 1 LEVEL      Orders Placed This Encounter   Medications    Elastic Bandages & Supports (LUMBAR BACK BRACE/SUPPORT PAD) MISC     Si Units by Does not apply route daily Pneumatic offloading back brace     Dispense:  1 each     Refill:  0    meloxicam (MOBIC) 15 MG tablet     Sig: Take 1 tablet by mouth daily     Dispense:  30 tablet     Refill:  0      MRI lumbar spine  Report was reviewed  Images reviewed independently  Images were shown to the patient  Pertinent finding discussed with patient  Modic changes in L3 and L4 vertebrae's  I think at this account for her axial back pain that was going on for 6 months  L4-L5 level left-sided disc herniation with impingement of the exiting left L4 nerve root  I think this accounts for her severe flareup of low back pain with left-sided sciatica and associated with depressed left knee reflex      I will advise patient to continue core strengthening exercise  Will advised to continue inversion table use  Will order offloading lumbar back brace  We will discontinue Motrin and will start on meloxicam  We will schedule for lumbar epidural steroid injection    Consultation note sent to the referring physician  Electronically signed by Luigi Kelsey MD on 2021 at 12:09 PM

## 2021-07-15 ENCOUNTER — TELEPHONE (OUTPATIENT)
Dept: INTERNAL MEDICINE CLINIC | Age: 66
End: 2021-07-15

## 2021-07-15 ENCOUNTER — HOSPITAL ENCOUNTER (OUTPATIENT)
Age: 66
Discharge: HOME | End: 2021-07-15
Payer: COMMERCIAL

## 2021-07-15 DIAGNOSIS — R82.998 DARK URINE: ICD-10-CM

## 2021-07-15 DIAGNOSIS — Z91.09 ENVIRONMENTAL ALLERGIES: ICD-10-CM

## 2021-07-15 DIAGNOSIS — G89.29 CHRONIC RIGHT SHOULDER PAIN: ICD-10-CM

## 2021-07-15 DIAGNOSIS — M25.511 CHRONIC RIGHT SHOULDER PAIN: ICD-10-CM

## 2021-07-15 DIAGNOSIS — R31.9 HEMATURIA, UNSPECIFIED TYPE: ICD-10-CM

## 2021-07-15 DIAGNOSIS — R41.840 ATTENTION DEFICIT: ICD-10-CM

## 2021-07-15 DIAGNOSIS — R51.9 LEFT-SIDED FACE PAIN: ICD-10-CM

## 2021-07-15 DIAGNOSIS — R79.89 ABNORMAL LFTS: Primary | ICD-10-CM

## 2021-07-15 DIAGNOSIS — R31.9 HEMATURIA, UNSPECIFIED TYPE: Primary | ICD-10-CM

## 2021-07-15 DIAGNOSIS — E78.00 HIGH CHOLESTEROL: ICD-10-CM

## 2021-07-15 LAB
-: NORMAL
ALBUMIN SERPL-MCNC: 3.7 G/DL (ref 3.5–5.2)
ALBUMIN/GLOBULIN RATIO: 1.2 (ref 1–2.5)
ALP BLD-CCNC: 196 U/L (ref 35–104)
ALT SERPL-CCNC: 147 U/L (ref 5–33)
AMORPHOUS: NORMAL
ANION GAP SERPL CALCULATED.3IONS-SCNC: 15 MMOL/L (ref 9–17)
AST SERPL-CCNC: 99 U/L
BACTERIA: NORMAL
BILIRUB SERPL-MCNC: 0.37 MG/DL (ref 0.3–1.2)
BILIRUBIN URINE: NEGATIVE
BUN BLDV-MCNC: 7 MG/DL (ref 8–23)
BUN/CREAT BLD: ABNORMAL (ref 9–20)
CALCIUM SERPL-MCNC: 8.5 MG/DL (ref 8.6–10.4)
CASTS UA: NORMAL /LPF (ref 0–8)
CHLORIDE BLD-SCNC: 99 MMOL/L (ref 98–107)
CHOLESTEROL/HDL RATIO: 4.8
CHOLESTEROL: 189 MG/DL
CO2: 20 MMOL/L (ref 20–31)
COLOR: YELLOW
COMMENT UA: ABNORMAL
CREAT SERPL-MCNC: 0.44 MG/DL (ref 0.5–0.9)
CRYSTALS, UA: NORMAL /HPF
EPITHELIAL CELLS UA: NORMAL /HPF (ref 0–5)
GFR AFRICAN AMERICAN: >60 ML/MIN
GFR NON-AFRICAN AMERICAN: >60 ML/MIN
GFR SERPL CREATININE-BSD FRML MDRD: ABNORMAL ML/MIN/{1.73_M2}
GFR SERPL CREATININE-BSD FRML MDRD: ABNORMAL ML/MIN/{1.73_M2}
GLUCOSE BLD-MCNC: 105 MG/DL (ref 70–99)
GLUCOSE URINE: NEGATIVE
HCT VFR BLD CALC: 34 % (ref 36.3–47.1)
HDLC SERPL-MCNC: 39 MG/DL
HEMOGLOBIN: 10.7 G/DL (ref 11.9–15.1)
KETONES, URINE: NEGATIVE
LDL CHOLESTEROL: 131 MG/DL (ref 0–130)
LEUKOCYTE ESTERASE, URINE: ABNORMAL
MCH RBC QN AUTO: 30.3 PG (ref 25.2–33.5)
MCHC RBC AUTO-ENTMCNC: 31.5 G/DL (ref 28.4–34.8)
MCV RBC AUTO: 96.3 FL (ref 82.6–102.9)
MUCUS: NORMAL
NITRITE, URINE: NEGATIVE
NRBC AUTOMATED: 0 PER 100 WBC
OTHER OBSERVATIONS UA: NORMAL
PDW BLD-RTO: 12.7 % (ref 11.8–14.4)
PH UA: 6.5 (ref 5–8)
PLATELET # BLD: 282 K/UL (ref 138–453)
PMV BLD AUTO: 11.6 FL (ref 8.1–13.5)
POTASSIUM SERPL-SCNC: 3.7 MMOL/L (ref 3.7–5.3)
PROTEIN UA: NEGATIVE
RBC # BLD: 3.53 M/UL (ref 3.95–5.11)
RBC UA: NORMAL /HPF (ref 0–4)
RENAL EPITHELIAL, UA: NORMAL /HPF
SODIUM BLD-SCNC: 134 MMOL/L (ref 135–144)
SPECIFIC GRAVITY UA: 1.01 (ref 1–1.03)
TOTAL PROTEIN: 6.9 G/DL (ref 6.4–8.3)
TRICHOMONAS: NORMAL
TRIGL SERPL-MCNC: 96 MG/DL
TSH SERPL DL<=0.05 MIU/L-ACNC: 3.12 MIU/L (ref 0.3–5)
TURBIDITY: CLEAR
URINE HGB: ABNORMAL
UROBILINOGEN, URINE: NORMAL
VLDLC SERPL CALC-MCNC: ABNORMAL MG/DL (ref 1–30)
WBC # BLD: 7.5 K/UL (ref 3.5–11.3)
WBC UA: NORMAL /HPF (ref 0–5)
YEAST: NORMAL

## 2021-07-15 PROCEDURE — 85027 COMPLETE CBC AUTOMATED: CPT

## 2021-07-15 PROCEDURE — 36415 COLL VENOUS BLD VENIPUNCTURE: CPT

## 2021-07-15 PROCEDURE — 80061 LIPID PANEL: CPT

## 2021-07-15 PROCEDURE — 80053 COMPREHEN METABOLIC PANEL: CPT

## 2021-07-15 PROCEDURE — 81001 URINALYSIS AUTO W/SCOPE: CPT

## 2021-07-15 PROCEDURE — 84443 ASSAY THYROID STIM HORMONE: CPT

## 2021-07-15 RX ORDER — CIPROFLOXACIN 500 MG/1
500 TABLET, FILM COATED ORAL 2 TIMES DAILY
Qty: 14 TABLET | Refills: 0 | Status: SHIPPED | OUTPATIENT
Start: 2021-07-15 | End: 2021-07-22

## 2021-07-15 NOTE — TELEPHONE ENCOUNTER
Patient spoke with Dr  With patient and explained to her all the lab work and also gave her the options of going to the emergency room in case if anything gets worse and also strongly counseled about avoiding medications except tramadol as patient has significant pain and also patient was given meloxicam by the pain management recently and I told her to avoid that medication and we did order a CT of the abdomen pelvis and advised her to increase fluids and patient verbalized understanding

## 2021-07-15 NOTE — TELEPHONE ENCOUNTER
----- Message from Larry Norris MD sent at 7/15/2021  4:34 PM EDT -----  Patient's labs show blood in the urine and may be there is infection possibly and and also her liver tests were very elevated which were normal last time and would advise patient to stop the pain medications and muscle relaxers and if she is taking any ibuprofen need to stop that medication except she can take the tramadol only as needed if her pain is too bad and will also stop the gabapentin for now  Need to do a CT of the abdomen and pelvis without contrast and also repeat ALT AST and alkaline phosphatase next week  Also start on Cipro 500 mg twice daily for 7 days and increase fluid intake  Alternatively patient can go to the emergency room

## 2021-07-15 NOTE — TELEPHONE ENCOUNTER
----- Message from Taina Chan sent at 7/14/2021  7:02 PM EDT -----  Subject: Message to Provider    QUESTIONS  Information for Provider? Pt called in stating that her urine is darker   than usual and thinks it is from her medication. She wants to know if this   is cause for concern. She noticed it today.   ---------------------------------------------------------------------------  --------------  CALL BACK INFO  What is the best way for the office to contact you? OK to leave message on   voicemail  Preferred Call Back Phone Number? [phone]  ---------------------------------------------------------------------------  --------------  SCRIPT ANSWERS  Relationship to Patient?  Self

## 2021-07-16 ENCOUNTER — TELEPHONE (OUTPATIENT)
Dept: INTERNAL MEDICINE CLINIC | Age: 66
End: 2021-07-16

## 2021-07-16 ENCOUNTER — TELEPHONE (OUTPATIENT)
Dept: PAIN MANAGEMENT | Age: 66
End: 2021-07-16

## 2021-07-16 NOTE — TELEPHONE ENCOUNTER
Pt states she was informed by her PCP that she has blood in her urine and elevated liver function tests. Dr. Rosalia Burrows instructed her to stop taking the meloxicam prescribed by pain management until her liver function tests improve. Pt states all medications were discontinued and would like to know if there is anything she can take for pain that would not effect her liver or kidneys. Patient is scheduled for Left L4 and L5 DIA on 8/2/21. Writer reviewed chart, Dr. Rosalia Burrows provided pt with a 10 day supply of Tramadol and instructed her to only take Tramadol due to her significant pain. Advised pt to contact PCP for further instructions on pain medication until her liver and kidney function improves.  Please advise if any further action is needed

## 2021-07-16 NOTE — TELEPHONE ENCOUNTER
Patient informed also went over notes from Dr Stanislaw Carrasco from last night    Patient did speak with pain management and was told he does not prescribe tramadol and anything else would irritate the bladder/and are on the list of medications patient can not take    Patient is also asking if she can take tylenol

## 2021-07-16 NOTE — TELEPHONE ENCOUNTER
She is seeing pain management for this problem. I encourage her to call and speak with them. Let them know she is having pain and she is looking for direction for this problem.  It is not appropriate to prescribe over a specialist.

## 2021-07-16 NOTE — TELEPHONE ENCOUNTER
----- Message from Jessica Stafford sent at 7/15/2021  6:27 PM EDT -----  Subject: Message to Provider    QUESTIONS  Information for Provider? Pt wants to know if there is something she can   take to help her sleep with the pain since she was asked not to take her   meds, pain meds and Ibuprofen. Please call her in the am.  ---------------------------------------------------------------------------  --------------  CALL BACK INFO  What is the best way for the office to contact you? OK to leave message on   voicemail  Preferred Call Back Phone Number? [phone]  ---------------------------------------------------------------------------  --------------  SCRIPT ANSWERS  Relationship to Patient?  Self

## 2021-07-16 NOTE — TELEPHONE ENCOUNTER
Pt advised, she says Dr Keith Felipe, says she can take tramadol. She says she has called pain management and they told her that dr Keith Felipe needs to take care of this . She also wants to know if she could tylenol for the breakthrough pain along with the tramadol.   She says all pain management wants  to do is give her shots and that is not until 8/2/21

## 2021-07-16 NOTE — TELEPHONE ENCOUNTER
I am in agreement with pain management in regards to the opioid. Also, it is unclear as to why there is thought that tramadol would be any different for her bladder than the percocet. She will have to discuss this further with Dr. Ambrocio Parks when he returns. She can take Tylenol as needed for pain until then. I also recommend OTC Voltaren gel to affected areas.

## 2021-07-16 NOTE — TELEPHONE ENCOUNTER
Patient was taken off of her percocet for now because she has blood in her urine. She is now taking Ultram 50mg. She states that it only is lasting about 25 minutes. She is asking if she can get that amount increased?     Please advise

## 2021-07-20 ENCOUNTER — OFFICE VISIT (OUTPATIENT)
Dept: INTERNAL MEDICINE CLINIC | Age: 66
End: 2021-07-20
Payer: COMMERCIAL

## 2021-07-20 ENCOUNTER — HOSPITAL ENCOUNTER (OUTPATIENT)
Dept: VASCULAR LAB | Age: 66
Discharge: HOME | End: 2021-07-20
Payer: COMMERCIAL

## 2021-07-20 VITALS
HEIGHT: 67 IN | DIASTOLIC BLOOD PRESSURE: 76 MMHG | BODY MASS INDEX: 24.89 KG/M2 | RESPIRATION RATE: 24 BRPM | HEART RATE: 90 BPM | OXYGEN SATURATION: 97 % | SYSTOLIC BLOOD PRESSURE: 138 MMHG | TEMPERATURE: 98.2 F | WEIGHT: 158.6 LBS

## 2021-07-20 DIAGNOSIS — M54.9 INTRACTABLE BACK PAIN: ICD-10-CM

## 2021-07-20 DIAGNOSIS — R31.9 HEMATURIA, UNSPECIFIED TYPE: ICD-10-CM

## 2021-07-20 DIAGNOSIS — R79.89 ABNORMAL LFTS: ICD-10-CM

## 2021-07-20 DIAGNOSIS — M54.9 INTRACTABLE BACK PAIN: Primary | ICD-10-CM

## 2021-07-20 DIAGNOSIS — M51.26 PROTRUSION OF LUMBAR INTERVERTEBRAL DISC: ICD-10-CM

## 2021-07-20 DIAGNOSIS — M79.89 LEFT LEG SWELLING: ICD-10-CM

## 2021-07-20 PROCEDURE — 99215 OFFICE O/P EST HI 40 MIN: CPT | Performed by: INTERNAL MEDICINE

## 2021-07-20 PROCEDURE — 93971 EXTREMITY STUDY: CPT

## 2021-07-20 RX ORDER — OXYCODONE HYDROCHLORIDE 5 MG/1
5 TABLET ORAL EVERY 12 HOURS PRN
Qty: 20 TABLET | Refills: 0 | Status: SHIPPED | OUTPATIENT
Start: 2021-07-20 | End: 2021-07-30

## 2021-07-20 ASSESSMENT — PATIENT HEALTH QUESTIONNAIRE - PHQ9
SUM OF ALL RESPONSES TO PHQ QUESTIONS 1-9: 0
2. FEELING DOWN, DEPRESSED OR HOPELESS: 0
SUM OF ALL RESPONSES TO PHQ QUESTIONS 1-9: 0
1. LITTLE INTEREST OR PLEASURE IN DOING THINGS: 0
SUM OF ALL RESPONSES TO PHQ QUESTIONS 1-9: 0
SUM OF ALL RESPONSES TO PHQ9 QUESTIONS 1 & 2: 0

## 2021-07-20 NOTE — PROGRESS NOTES
Ritu Conley is a 60 y.o. female who presents for   Chief Complaint   Patient presents with    Follow-up     tramadol is not working; not getting any sleep; would like to know wha ther options    Health Maintenance     hep c, hiv, shingles, d screen, cervical, covid    and follow up of chronic medical problems. Patient Active Problem List   Diagnosis    IBS (irritable bowel syndrome)    Panic disorder    Chest pain    Lumbar disc herniation    Left lumbar radiculitis    DDD (degenerative disc disease), lumbar     HPI  Here for follow-up on back pain and patient is still in considerable discomfort and also wants to discuss about her lab tests and patient states that she was taking ibuprofen heavy doses from May because of the back pain    Current Outpatient Medications   Medication Sig Dispense Refill    oxyCODONE (ROXICODONE) 5 MG immediate release tablet Take 1 tablet by mouth every 12 hours as needed for Pain for up to 10 days. Intended supply: 5 days. Take lowest dose possible to manage pain 20 tablet 0    ciprofloxacin (CIPRO) 500 MG tablet Take 1 tablet by mouth 2 times daily for 7 days 14 tablet 0    Elastic Bandages & Supports (LUMBAR BACK BRACE/SUPPORT PAD) MISC 1 Units by Does not apply route daily Pneumatic offloading back brace 1 each 0    beclomethasone (QVAR) 80 MCG/ACT inhaler INHALE 1 PUFF ORALLY TWICE DAILY 16 Inhaler 5    tiZANidine (ZANAFLEX) 4 MG tablet Take 1 tablet by mouth 2 times daily for 20 days 40 tablet 0    fluticasone (FLONASE) 50 MCG/ACT nasal spray USE 1 SPRAY NASALLY DAILY 1 Bottle 5    QVAR REDIHALER 80 MCG/ACT AERB inhaler       meloxicam (MOBIC) 15 MG tablet Take 1 tablet by mouth daily 30 tablet 0    gabapentin (NEURONTIN) 300 MG capsule Take 1 capsule by mouth 3 times daily for 90 days.  (Patient not taking: Reported on 7/20/2021) 90 capsule 0    ibuprofen (ADVIL;MOTRIN) 200 MG tablet Take 3 tablets by mouth every 8 hours as needed for Pain or Fever (Patient not taking: Reported on 7/20/2021) 30 tablet 0    tiZANidine (ZANAFLEX) 2 MG tablet Take 1 tablet by mouth nightly as needed (back spasms or pain) (Patient not taking: Reported on 7/14/2021) 10 tablet 0    Multiple Vitamins-Minerals (THERAPEUTIC MULTIVITAMIN-MINERALS) tablet Take 1 tablet by mouth daily (Patient not taking: Reported on 7/20/2021)      Ascorbic Acid (VITAMIN C) 250 MG tablet Take 250 mg by mouth daily (Patient not taking: Reported on 7/20/2021)      Coenzyme Q10 (CO Q 10 PO) Take by mouth (Patient not taking: Reported on 7/20/2021)      Cholecalciferol (VITAMIN D3) 30 MCG/15ML LIQD Take by mouth (Patient not taking: Reported on 7/20/2021)      atorvastatin (LIPITOR) 10 MG tablet TAKE ONE TABLET BY MOUTH TWICE A WEEK (Patient not taking: Reported on 7/14/2021) 22 tablet 0    Cetirizine HCl (ZYRTEC ALLERGY PO) Take 1 tablet by mouth daily (Patient not taking: Reported on 7/20/2021)       No current facility-administered medications for this visit.        Allergies   Allergen Reactions    Pcn [Penicillins]     Sulfa Antibiotics        Past Medical History:   Diagnosis Date    Asthma     DDD (degenerative disc disease), lumbar 7/14/2021    IBS (irritable bowel syndrome)     Left lumbar radiculitis 7/14/2021    Panic disorder        Past Surgical History:   Procedure Laterality Date    CYST INCISION AND DRAINAGE Right 11/1997    NASAL POLYP SURGERY         Family History   Problem Relation Age of Onset    High Blood Pressure Mother     High Blood Pressure Father     Cancer Father         melanoma    Heart Disease Maternal Grandfather      ROS   Constitutional:  Negative for fatigue, loss of appetite and unexpected weight change   HEENT            : Negative for neck stiffness and pain, no congestion or sinus pressure   Eyes                : No visual disturbance or pain   Cardiovascular: No chest pain or palpitations or leg swelling   Respiratory      : Negative for cough, shortness of breath or wheezing   Gastrointestinal: Negative for abdominal pain, constipation or diarrhea and bloating No nausea or vomiting   Genitourinary:     No urgency or frequency, no burning or hematuria   Musculoskeletal: Positive for arthralgias, back pain or myalgias   Skin                  : Negative for rash or erythema   Neurological    : Negative for dizziness, weakness, tremors ,light headedness or syncope   Psychiatric       : Negative for dysphoric mood, sleep disturbances, nervous or anxious, or decreased concentration   All other review of systems was negative    Objective  Physical Examination:    Nursing note reviewed    /76 (Site: Left Upper Arm, Position: Sitting, Cuff Size: Medium Adult)   Pulse 90   Temp 98.2 °F (36.8 °C) (Temporal)   Resp 24   Ht 5' 7.01\" (1.702 m)   Wt 158 lb 9.6 oz (71.9 kg)   LMP 01/01/2001 (LMP Unknown)   SpO2 97%   Breastfeeding No   BMI 24.83 kg/m²   BP Readings from Last 3 Encounters:   07/20/21 138/76   07/14/21 111/74   07/09/21 110/72         Constitutional:  Val Shelton is oriented to place, person and time ,appears well-developed and well-nourished  HEENT:  Atraumatic and normocephalic, external ears normal bilaterally, nose normal no oropharyngeal exudate and is clear and moist  Eyes:  EOCM normal; conjunctivae normal; PERRLA bilaterally  Neck:  Normal range of motion, neck supple, no JVD and no thyromegaly  Cardiovascular:  RRR, normal heart sounds and intact distal pulses  Pulmonary:  effort normal and breath sounds normal bilaterally,no wheezes or rales, no respiratory distress  Abdominal:  Soft, non-tender; normal bowel sounds, no masses  Musculoskeletal: Very limited range of motion and no edema or tenderness bilaterally  No lymphadenopathy  Neurological:  alert, oriented, and normal reflexes bilaterally  Skin: warm and dry  Psychiatric:  normal mood and effect; behavior normal.    Labs:   No results found for: LABA1C  Lab Results Component Value Date    CHOL 189 07/15/2021     Lab Results   Component Value Date    HDL 39 (L) 07/15/2021     Lab Results   Component Value Date    LDLCALC 128 11/16/2020     Lab Results   Component Value Date    TRIG 96 07/15/2021     No components found for: [place], [place]  Lab Results   Component Value Date    WBC 7.5 07/15/2021    HGB 10.7 (L) 07/15/2021    HCT 34.0 (L) 07/15/2021    MCV 96.3 07/15/2021     07/15/2021     Lab Results   Component Value Date    INR 0.9 01/03/2018    PROTIME 9.7 01/03/2018     Lab Results   Component Value Date    GLUCOSE 105 (H) 07/15/2021    CREATININE 0.44 (L) 07/15/2021    BUN 7 (L) 07/15/2021     (L) 07/15/2021    K 3.7 07/15/2021    CL 99 07/15/2021    CO2 20 07/15/2021     Lab Results   Component Value Date     (H) 07/15/2021    AST 99 (H) 07/15/2021    ALKPHOS 196 (H) 07/15/2021    BILITOT 0.37 07/15/2021     Lab Results   Component Value Date    LABALBU 3.7 07/15/2021     Lab Results   Component Value Date    TSH 3.12 07/15/2021     Assessment:  1. Intractable back pain    2. Left leg swelling    3. Hematuria, unspecified type    4. Abnormal LFTs    5.  Protrusion of lumbar intervertebral disc        Plan:  Patient's visit to the pain management reviewed and patient had elevated liver enzymes and also had blood in the urine for which patient was started on Cipro antibiotic for possible UTI and also advised to repeat lab work including urinalysis ALT and AST and alkaline phosphatase which were elevated initially and also patient advised to get the CT scan of the abdomen and pelvis which is scheduled for Friday evening  Patient was told by the pain management to see me for pain medications as we referred her to see them for pain control both for medication management and also epidural injections and will send a note to their office but for now as patient is in considerable pain and as her liver tests are elevated advised patient to stop all the medications and as tramadol is not helping I told patient to start on oxycodone 5 mg twice a day and call me back if pain gets any worse  Patient also complaining of left leg swelling mostly in the dorsum of the foot and the anterior aspect of the leg but no tenderness and will do Doppler scan to evaluate to rule out any blood clot  I strongly counseled patient and explained to her about the addictive potential of the medications and discouraged her from taking the medication for too long or too many and encouraged her to follow-up with the pain management as she has scheduled for cortisone injections on the first week of August and also patient getting a second opinion from another back surgeon Dr. María Blanco and is scheduled on 9 August  Total time in review of the reports discussion coordination of care more than 45 minutes  Review in 2 weeks           1. Jose Argueta received counseling on the following healthy behaviors: nutrition and exercise    2. Prior labs and health maintenance reviewed. 3.  Discussed use, benefit, and side effects of prescribed medications. Barriers to medication compliance addressed. All her questions were answered. Pt voiced understanding. Jose Argueta will continue current medications, diet and exercise. Orders Placed This Encounter   Medications    oxyCODONE (ROXICODONE) 5 MG immediate release tablet     Sig: Take 1 tablet by mouth every 12 hours as needed for Pain for up to 10 days. Intended supply: 5 days. Take lowest dose possible to manage pain     Dispense:  20 tablet     Refill:  0     Reduce doses taken as pain becomes manageable          Completed Refills               Requested Prescriptions     Signed Prescriptions Disp Refills    oxyCODONE (ROXICODONE) 5 MG immediate release tablet 20 tablet 0     Sig: Take 1 tablet by mouth every 12 hours as needed for Pain for up to 10 days. Intended supply: 5 days. Take lowest dose possible to manage pain     4.  Patient given educational

## 2021-07-22 ENCOUNTER — TELEPHONE (OUTPATIENT)
Dept: INTERNAL MEDICINE CLINIC | Age: 66
End: 2021-07-22

## 2021-07-22 ENCOUNTER — TELEPHONE (OUTPATIENT)
Dept: PAIN MANAGEMENT | Age: 66
End: 2021-07-22

## 2021-07-22 DIAGNOSIS — R79.89 ABNORMAL LFTS: Primary | ICD-10-CM

## 2021-07-22 NOTE — TELEPHONE ENCOUNTER
Patient called stating she would like to do acupuncture and was wondering if there was something she could take besides the pain medication at night.  Please advise

## 2021-07-23 ENCOUNTER — HOSPITAL ENCOUNTER (OUTPATIENT)
Age: 66
Setting detail: SPECIMEN
Discharge: HOME | End: 2021-07-23
Payer: COMMERCIAL

## 2021-07-23 DIAGNOSIS — M54.9 INTRACTABLE BACK PAIN: ICD-10-CM

## 2021-07-23 DIAGNOSIS — R79.89 ABNORMAL LFTS: ICD-10-CM

## 2021-07-23 DIAGNOSIS — R31.9 HEMATURIA, UNSPECIFIED TYPE: ICD-10-CM

## 2021-07-23 DIAGNOSIS — M79.89 LEFT LEG SWELLING: ICD-10-CM

## 2021-07-23 LAB
-: NORMAL
ALT SERPL-CCNC: 57 U/L (ref 5–33)
AMORPHOUS: NORMAL
AST SERPL-CCNC: 22 U/L
BACTERIA: NORMAL
BILIRUBIN URINE: NEGATIVE
CASTS UA: NORMAL /LPF (ref 0–8)
COLOR: YELLOW
COMMENT UA: ABNORMAL
CRYSTALS, UA: NORMAL /HPF
EPITHELIAL CELLS UA: NORMAL /HPF (ref 0–5)
GLUCOSE URINE: NEGATIVE
KETONES, URINE: NEGATIVE
LEUKOCYTE ESTERASE, URINE: NEGATIVE
MUCUS: NORMAL
NITRITE, URINE: NEGATIVE
OTHER OBSERVATIONS UA: NORMAL
PH UA: 6 (ref 5–8)
PROTEIN UA: ABNORMAL
RBC UA: NORMAL /HPF (ref 0–4)
RENAL EPITHELIAL, UA: NORMAL /HPF
SPECIFIC GRAVITY UA: 1.02 (ref 1–1.03)
TRICHOMONAS: NORMAL
TURBIDITY: CLEAR
URINE HGB: ABNORMAL
UROBILINOGEN, URINE: NORMAL
WBC UA: NORMAL /HPF (ref 0–5)
YEAST: NORMAL

## 2021-07-23 PROCEDURE — 81001 URINALYSIS AUTO W/SCOPE: CPT

## 2021-07-23 PROCEDURE — 84460 ALANINE AMINO (ALT) (SGPT): CPT

## 2021-07-23 PROCEDURE — 36415 COLL VENOUS BLD VENIPUNCTURE: CPT

## 2021-07-23 PROCEDURE — 84450 TRANSFERASE (AST) (SGOT): CPT

## 2021-07-26 ENCOUNTER — TELEPHONE (OUTPATIENT)
Dept: INTERNAL MEDICINE CLINIC | Age: 66
End: 2021-07-26

## 2021-07-26 ENCOUNTER — HOSPITAL ENCOUNTER (EMERGENCY)
Age: 66
Discharge: HOME | End: 2021-07-27
Attending: EMERGENCY MEDICINE
Payer: COMMERCIAL

## 2021-07-26 DIAGNOSIS — R00.2 PALPITATIONS: Primary | ICD-10-CM

## 2021-07-26 PROCEDURE — 93005 ELECTROCARDIOGRAM TRACING: CPT | Performed by: EMERGENCY MEDICINE

## 2021-07-26 PROCEDURE — 99282 EMERGENCY DEPT VISIT SF MDM: CPT

## 2021-07-26 RX ORDER — 0.9 % SODIUM CHLORIDE 0.9 %
1000 INTRAVENOUS SOLUTION INTRAVENOUS ONCE
Status: COMPLETED | OUTPATIENT
Start: 2021-07-27 | End: 2021-07-27

## 2021-07-26 NOTE — TELEPHONE ENCOUNTER
Pt called in to report symptoms over the last 2-3 days. States she is not sure if she should be concerned. Reports heart rate was staying in the 90s over the weekend, usually runs 70-74. Highest 120-130 most of the day yesterday. Resolved on it's own today. Also reports loose stools over the weekend 5-6 times/day, oily yesterday. No problem today. Reports she has nausea almost daily around dinner, resolves on it's own after dinner. Pt reports she is not taking any medications OTC or prescribed at this time. Pt was transferred to scheduling dept following this call to be scheduled for US renal, liver,GB, pancreas. Informed of result note.

## 2021-07-26 NOTE — TELEPHONE ENCOUNTER
Patient's liver tests normalized  urine still shows some blood and will wait for the ultrasound to be done before referring to urology  If patient is having fast heartbeat may need to see cardiology in the currently have symptoms I would advise him to go to the emergency room otherwise can follow-up with the cardiologist that her  sees and I think he sees [facility] for medical cardiology group

## 2021-07-27 ENCOUNTER — TELEPHONE (OUTPATIENT)
Dept: INTERNAL MEDICINE CLINIC | Age: 66
End: 2021-07-27

## 2021-07-27 ENCOUNTER — HOSPITAL ENCOUNTER (OUTPATIENT)
Dept: ULTRASOUND IMAGING | Facility: CLINIC | Age: 66
Discharge: HOME | End: 2021-07-29
Payer: COMMERCIAL

## 2021-07-27 ENCOUNTER — APPOINTMENT (OUTPATIENT)
Dept: CT IMAGING | Age: 66
End: 2021-07-27
Payer: COMMERCIAL

## 2021-07-27 VITALS
BODY MASS INDEX: 24.75 KG/M2 | HEART RATE: 94 BPM | TEMPERATURE: 98 F | SYSTOLIC BLOOD PRESSURE: 133 MMHG | DIASTOLIC BLOOD PRESSURE: 89 MMHG | RESPIRATION RATE: 16 BRPM | HEIGHT: 67 IN | OXYGEN SATURATION: 96 % | WEIGHT: 157.7 LBS

## 2021-07-27 DIAGNOSIS — R79.89 ABNORMAL LFTS: ICD-10-CM

## 2021-07-27 LAB
ABSOLUTE EOS #: 0.4 K/UL (ref 0–0.44)
ABSOLUTE IMMATURE GRANULOCYTE: 0.03 K/UL (ref 0–0.3)
ABSOLUTE LYMPH #: 2.14 K/UL (ref 1.1–3.7)
ABSOLUTE MONO #: 0.58 K/UL (ref 0.1–1.2)
AMPHETAMINE SCREEN URINE: NEGATIVE
ANION GAP SERPL CALCULATED.3IONS-SCNC: 13 MMOL/L (ref 9–17)
BARBITURATE SCREEN URINE: NEGATIVE
BASOPHILS # BLD: 3 % (ref 0–2)
BASOPHILS ABSOLUTE: 0.21 K/UL (ref 0–0.2)
BENZODIAZEPINE SCREEN, URINE: NEGATIVE
BUN BLDV-MCNC: 8 MG/DL (ref 8–23)
BUN/CREAT BLD: 15 (ref 9–20)
BUPRENORPHINE URINE: NORMAL
CALCIUM SERPL-MCNC: 9.1 MG/DL (ref 8.6–10.4)
CANNABINOID SCREEN URINE: NEGATIVE
CHLORIDE BLD-SCNC: 98 MMOL/L (ref 98–107)
CO2: 24 MMOL/L (ref 20–31)
COCAINE METABOLITE, URINE: NEGATIVE
CREAT SERPL-MCNC: 0.52 MG/DL (ref 0.5–0.9)
D-DIMER QUANTITATIVE: 1.02 MG/L FEU (ref 0–0.59)
DIFFERENTIAL TYPE: ABNORMAL
EOSINOPHILS RELATIVE PERCENT: 6 % (ref 1–4)
GFR AFRICAN AMERICAN: >60 ML/MIN
GFR NON-AFRICAN AMERICAN: >60 ML/MIN
GFR SERPL CREATININE-BSD FRML MDRD: ABNORMAL ML/MIN/{1.73_M2}
GFR SERPL CREATININE-BSD FRML MDRD: ABNORMAL ML/MIN/{1.73_M2}
GLUCOSE BLD-MCNC: 116 MG/DL (ref 70–99)
HCT VFR BLD CALC: 41.3 % (ref 36.3–47.1)
HEMOGLOBIN: 13 G/DL (ref 11.9–15.1)
IMMATURE GRANULOCYTES: 0 %
LYMPHOCYTES # BLD: 30 % (ref 24–43)
MAGNESIUM: 2.2 MG/DL (ref 1.6–2.6)
MCH RBC QN AUTO: 30.3 PG (ref 25.2–33.5)
MCHC RBC AUTO-ENTMCNC: 31.5 G/DL (ref 28.4–34.8)
MCV RBC AUTO: 96.3 FL (ref 82.6–102.9)
MDMA URINE: NORMAL
METHADONE SCREEN, URINE: NEGATIVE
METHAMPHETAMINE, URINE: NORMAL
MONOCYTES # BLD: 8 % (ref 3–12)
NRBC AUTOMATED: 0 PER 100 WBC
OPIATES, URINE: NEGATIVE
OXYCODONE SCREEN URINE: NEGATIVE
PDW BLD-RTO: 12.7 % (ref 11.8–14.4)
PHENCYCLIDINE, URINE: NEGATIVE
PLATELET # BLD: 462 K/UL (ref 138–453)
PLATELET ESTIMATE: ABNORMAL
PMV BLD AUTO: 9.8 FL (ref 8.1–13.5)
POTASSIUM SERPL-SCNC: 3.7 MMOL/L (ref 3.7–5.3)
PROPOXYPHENE, URINE: NORMAL
RBC # BLD: 4.29 M/UL (ref 3.95–5.11)
RBC # BLD: ABNORMAL 10*6/UL
SEG NEUTROPHILS: 53 % (ref 36–65)
SEGMENTED NEUTROPHILS ABSOLUTE COUNT: 3.69 K/UL (ref 1.5–8.1)
SODIUM BLD-SCNC: 135 MMOL/L (ref 135–144)
TEST INFORMATION: NORMAL
TRICYCLIC ANTIDEPRESSANTS, UR: NORMAL
TROPONIN INTERP: NORMAL
TROPONIN INTERP: NORMAL
TROPONIN T: NORMAL NG/ML
TROPONIN T: NORMAL NG/ML
TROPONIN, HIGH SENSITIVITY: 10 NG/L (ref 0–14)
TROPONIN, HIGH SENSITIVITY: 9 NG/L (ref 0–14)
TSH SERPL DL<=0.05 MIU/L-ACNC: 2.01 MIU/L (ref 0.3–5)
WBC # BLD: 7.1 K/UL (ref 3.5–11.3)
WBC # BLD: ABNORMAL 10*3/UL

## 2021-07-27 PROCEDURE — 71260 CT THORAX DX C+: CPT

## 2021-07-27 PROCEDURE — 83735 ASSAY OF MAGNESIUM: CPT

## 2021-07-27 PROCEDURE — 85025 COMPLETE CBC W/AUTO DIFF WBC: CPT

## 2021-07-27 PROCEDURE — 85379 FIBRIN DEGRADATION QUANT: CPT

## 2021-07-27 PROCEDURE — 80307 DRUG TEST PRSMV CHEM ANLYZR: CPT

## 2021-07-27 PROCEDURE — 76705 ECHO EXAM OF ABDOMEN: CPT

## 2021-07-27 PROCEDURE — 76770 US EXAM ABDO BACK WALL COMP: CPT

## 2021-07-27 PROCEDURE — 84484 ASSAY OF TROPONIN QUANT: CPT

## 2021-07-27 PROCEDURE — 2580000003 HC RX 258: Performed by: EMERGENCY MEDICINE

## 2021-07-27 PROCEDURE — 6360000004 HC RX CONTRAST MEDICATION: Performed by: EMERGENCY MEDICINE

## 2021-07-27 PROCEDURE — 84443 ASSAY THYROID STIM HORMONE: CPT

## 2021-07-27 PROCEDURE — 80048 BASIC METABOLIC PNL TOTAL CA: CPT

## 2021-07-27 RX ORDER — SODIUM CHLORIDE 0.9 % (FLUSH) 0.9 %
10 SYRINGE (ML) INJECTION PRN
Status: DISCONTINUED | OUTPATIENT
Start: 2021-07-27 | End: 2021-07-27 | Stop reason: HOSPADM

## 2021-07-27 RX ORDER — 0.9 % SODIUM CHLORIDE 0.9 %
80 INTRAVENOUS SOLUTION INTRAVENOUS ONCE
Status: COMPLETED | OUTPATIENT
Start: 2021-07-27 | End: 2021-07-27

## 2021-07-27 RX ADMIN — SODIUM CHLORIDE, PRESERVATIVE FREE 10 ML: 5 INJECTION INTRAVENOUS at 01:33

## 2021-07-27 RX ADMIN — SODIUM CHLORIDE 1000 ML: 9 INJECTION, SOLUTION INTRAVENOUS at 00:19

## 2021-07-27 RX ADMIN — SODIUM CHLORIDE 80 ML: 9 INJECTION, SOLUTION INTRAVENOUS at 01:33

## 2021-07-27 RX ADMIN — IOPAMIDOL 75 ML: 755 INJECTION, SOLUTION INTRAVENOUS at 01:33

## 2021-07-27 ASSESSMENT — ENCOUNTER SYMPTOMS
VOMITING: 0
EYE DISCHARGE: 0
COLOR CHANGE: 0
NAUSEA: 0
COUGH: 0
DIARRHEA: 0
RHINORRHEA: 0
EYE REDNESS: 0
SHORTNESS OF BREATH: 0
SORE THROAT: 0

## 2021-07-27 NOTE — TELEPHONE ENCOUNTER
Pt called for results of imaging and labs done today. Pt has viewed results on [employer]. Please advise.

## 2021-07-27 NOTE — TELEPHONE ENCOUNTER
Ultrasound shows gallbladder stones but the liver kidneys and pancreas were within normal limits  Gallbladder stones sometimes could make her liver enzymes go up and would advise patient to see a general surgeon of her choice for evaluation  And even though kidney ultrasound was normal because of the blood in the urine I would advise patient to see a urologist and please make a referral  Patient's visit to the hospital regarding her tachycardia reviewed and patient still has symptoms I would advise to follow-up with cardiology

## 2021-07-27 NOTE — TELEPHONE ENCOUNTER
Pt informed of all  She has seen Dr Tone Cisse in past for her GB  Also wants to wait to see urology  Has appt upcoming appt with cardiology, unsure of date

## 2021-07-27 NOTE — ED PROVIDER NOTES
EMERGENCY DEPARTMENT ENCOUNTER    Pt Name: Kya Solis  MRN: [id number]  [place] 1955  Date of evaluation: 7/26/21  CHIEF COMPLAINT       Chief Complaint   Patient presents with    Tachycardia     HISTORY OF PRESENT ILLNESS   This is a 20-year-old female that presents with complaints of palpitations and feeling like she has tachycardia. Patient states that she has been watching her rhythm on her apple watch, she states that over the past few days she has been having some tachycardia. Patient states that she has a history of chronic pain, she states that she recently stopped taking opioids and stopped using alcohol. This was about a week ago. She states that she did for 3 days in a row smokes some medical marijuana that she obtained from a dispensary in [place]. Patient states that this was helping with her pain, the day after this she developed some mild tachycardia. Patient denies any history of thyroid disease, has not taking any over-the-counter supplements. She describes her symptoms as moderate. REVIEW OF SYSTEMS     Review of Systems   Constitutional: Negative for chills and fever. HENT: Negative for rhinorrhea and sore throat. Eyes: Negative for discharge, redness and visual disturbance. Respiratory: Negative for cough and shortness of breath. Cardiovascular: Positive for palpitations. Negative for chest pain and leg swelling. Tachycardia   Gastrointestinal: Negative for diarrhea, nausea and vomiting. Genitourinary: Negative for dysuria and hematuria. Musculoskeletal: Negative for arthralgias, myalgias and neck pain. Skin: Negative for color change and rash. Neurological: Negative for seizures, weakness and headaches. Psychiatric/Behavioral: Negative for hallucinations, self-injury and suicidal ideas.      PASTMEDICAL HISTORY     Past Medical History:   Diagnosis Date    Asthma     DDD (degenerative disc disease), lumbar 7/14/2021    IBS (irritable bowel syndrome)     Left lumbar radiculitis 7/14/2021    Panic disorder      Past Problem List  Patient Active Problem List   Diagnosis Code    IBS (irritable bowel syndrome) K58.9    Panic disorder F41.0    Chest pain R07.9    Lumbar disc herniation M51.26    Left lumbar radiculitis M54.16    DDD (degenerative disc disease), lumbar M51.36     SURGICAL HISTORY       Past Surgical History:   Procedure Laterality Date    CYST INCISION AND DRAINAGE Right 11/1997    NASAL POLYP SURGERY       CURRENT MEDICATIONS       Current Discharge Medication List      CONTINUE these medications which have NOT CHANGED    Details   oxyCODONE (ROXICODONE) 5 MG immediate release tablet Take 1 tablet by mouth every 12 hours as needed for Pain for up to 10 days. Intended supply: 5 days. Take lowest dose possible to manage pain  Qty: 20 tablet, Refills: 0    Comments: Reduce doses taken as pain becomes manageable  Associated Diagnoses: Intractable back pain; Left leg swelling; Hematuria, unspecified type; Abnormal LFTs      QVAR REDIHALER 80 MCG/ACT AERB inhaler       Elastic Bandages & Supports (LUMBAR BACK BRACE/SUPPORT PAD) MISC 1 Units by Does not apply route daily Pneumatic offloading back brace  Qty: 1 each, Refills: 0      meloxicam (MOBIC) 15 MG tablet Take 1 tablet by mouth daily  Qty: 30 tablet, Refills: 0      beclomethasone (QVAR) 80 MCG/ACT inhaler INHALE 1 PUFF ORALLY TWICE DAILY  Qty: 16 Inhaler, Refills: 5      gabapentin (NEURONTIN) 300 MG capsule Take 1 capsule by mouth 3 times daily for 90 days.   Qty: 90 capsule, Refills: 0      !! tiZANidine (ZANAFLEX) 4 MG tablet Take 1 tablet by mouth 2 times daily for 20 days  Qty: 40 tablet, Refills: 0      ibuprofen (ADVIL;MOTRIN) 200 MG tablet Take 3 tablets by mouth every 8 hours as needed for Pain or Fever  Qty: 30 tablet, Refills: 0      !! tiZANidine (ZANAFLEX) 2 MG tablet Take 1 tablet by mouth nightly as needed (back spasms or pain)  Qty: 10 tablet, Refills: 0 Multiple Vitamins-Minerals (THERAPEUTIC MULTIVITAMIN-MINERALS) tablet Take 1 tablet by mouth daily       Ascorbic Acid (VITAMIN C) 250 MG tablet Take 250 mg by mouth daily       Coenzyme Q10 (CO Q 10 PO) Take by mouth       Cholecalciferol (VITAMIN D3) 30 MCG/15ML LIQD Take by mouth       atorvastatin (LIPITOR) 10 MG tablet TAKE ONE TABLET BY MOUTH TWICE A WEEK  Qty: 22 tablet, Refills: 0      fluticasone (FLONASE) 50 MCG/ACT nasal spray USE 1 SPRAY NASALLY DAILY  Qty: 1 Bottle, Refills: 5      Cetirizine HCl (ZYRTEC ALLERGY PO) Take 1 tablet by mouth daily        ! ! - Potential duplicate medications found. Please discuss with provider. ALLERGIES     is allergic to pcn [penicillins] and sulfa antibiotics. FAMILY HISTORY     She indicated that her mother is alive. She indicated that her father is . She indicated that her sister is alive. She indicated that her brother is alive. She indicated that the status of her maternal grandfather is unknown. SOCIAL HISTORY       Social History     Tobacco Use    Smoking status: Never Smoker    Smokeless tobacco: Never Used   Substance Use Topics    Alcohol use: Yes     Alcohol/week: 7.0 standard drinks     Types: 7 Glasses of wine per week     Comment: 1-2 a few nights a week    Drug use: No     PHYSICAL EXAM     INITIAL VITALS: /89   Pulse 94   Temp 98 °F (36.7 °C)   Resp 16   Ht 5' 7\" (1.702 m)   Wt 157 lb 11.2 oz (71.5 kg)   LMP 2001 (LMP Unknown)   SpO2 96%   BMI 24.70 kg/m²    Physical Exam  Constitutional:       Appearance: Normal appearance. She is well-developed. She is not ill-appearing or toxic-appearing. HENT:      Head: Normocephalic and atraumatic. Eyes:      Conjunctiva/sclera: Conjunctivae normal.      Pupils: Pupils are equal, round, and reactive to light. Neck:      Trachea: Trachea normal.   Cardiovascular:      Rate and Rhythm: Regular rhythm. Tachycardia present.       Heart sounds: S1 normal and S2 normal. No murmur heard. Pulmonary:      Effort: Pulmonary effort is normal. No accessory muscle usage or respiratory distress. Breath sounds: Normal breath sounds. Chest:      Chest wall: No deformity or tenderness. Abdominal:      General: Bowel sounds are normal. There is no distension or abdominal bruit. Palpations: Abdomen is not rigid. Tenderness: There is no abdominal tenderness. There is no guarding or rebound. Musculoskeletal:      Cervical back: Normal range of motion and neck supple. Skin:     General: Skin is warm. Findings: No rash. Neurological:      Mental Status: She is alert and oriented to person, place, and time. GCS: GCS eye subscore is 4. GCS verbal subscore is 5. GCS motor subscore is 6. Psychiatric:         Speech: Speech normal.         MEDICAL DECISION MAKIN-year-old female presents with complaints of tachycardia, plan is basic labs thyroid studies IV fluids and reevaluation. 3:14 AM EDT  Patient's laboratory studies unremarkable, plan is discharged with outpatient follow-up. Return if symptoms worsen or change. CRITICAL CARE:       PROCEDURES:    Procedures    DIAGNOSTIC RESULTS   EKG:All EKG's are interpreted by the Emergency Department Physician who either signs or Co-signs this chart in the absence of a cardiologist.    Patient's EKG shows a narrow complex rhythm with isolated PVCs. Patient has left axis deviation, no ST elevations or depressions, no significant T wave changes. RADIOLOGY:All plain film, CT, MRI, and formal ultrasound images (except ED bedside ultrasound) are read by the radiologist, see reports below, unless otherwisenoted in MDM or here. CT CHEST PULMONARY EMBOLISM W CONTRAST   Final Result   No evidence of pulmonary embolism or acute pulmonary abnormality. LABS: All lab results were reviewed by myself, and all abnormals are listed below.   Labs Reviewed   BASIC METABOLIC PANEL - Abnormal; Notable for the following components:       Result Value    Glucose 116 (*)     All other components within normal limits   CBC WITH AUTO DIFFERENTIAL - Abnormal; Notable for the following components:    Platelets 750 (*)     Eosinophils % 6 (*)     Basophils 3 (*)     Basophils Absolute 0.21 (*)     All other components within normal limits   D-DIMER, QUANTITATIVE - Abnormal; Notable for the following components:    D-Dimer, Quant 1.02 (*)     All other components within normal limits   MAGNESIUM   TROPONIN   TROPONIN   TSH WITH REFLEX   URINE DRUG SCREEN       EMERGENCY DEPARTMENTCOURSE:         Vitals:    Vitals:    07/26/21 2245 07/27/21 0057   BP: 133/89    Pulse: 108 94   Resp: 16    Temp: 98 °F (36.7 °C)    SpO2: 96%    Weight: 157 lb 11.2 oz (71.5 kg)    Height: 5' 7\" (1.702 m)        The patient was given the following medications while in the emergency department:  Orders Placed This Encounter   Medications    0.9 % sodium chloride bolus    0.9 % sodium chloride bolus    iopamidol (ISOVUE-370) 76 % injection 75 mL    sodium chloride flush 0.9 % injection 10 mL     CONSULTS:  None    FINAL IMPRESSION      1.  Palpitations          DISPOSITION/PLAN   DISPOSITION Decision To Discharge 07/27/2021 03:02:35 AM      PATIENT REFERRED TO:  Ricky Palacios MD  [facility]  [address] [facility] [id number]    Schedule an appointment as soon as possible for a visit in 2 days      DISCHARGE MEDICATIONS:  Current Discharge Medication List        Rajesh Gaviria MD  Attending Emergency Physician                   Rajesh Gaviria MD  07/27/21 7017

## 2021-07-28 ENCOUNTER — TELEPHONE (OUTPATIENT)
Dept: INTERNAL MEDICINE CLINIC | Age: 66
End: 2021-07-28

## 2021-07-28 DIAGNOSIS — R31.9 HEMATURIA, UNSPECIFIED TYPE: Primary | ICD-10-CM

## 2021-07-28 LAB
EKG ATRIAL RATE: 88 BPM
EKG P AXIS: 42 DEGREES
EKG P-R INTERVAL: 200 MS
EKG Q-T INTERVAL: 376 MS
EKG QRS DURATION: 76 MS
EKG QTC CALCULATION (BAZETT): 454 MS
EKG R AXIS: -24 DEGREES
EKG T AXIS: 71 DEGREES
EKG VENTRICULAR RATE: 88 BPM

## 2021-07-28 PROCEDURE — 93010 ELECTROCARDIOGRAM REPORT: CPT | Performed by: INTERNAL MEDICINE

## 2021-07-28 NOTE — TELEPHONE ENCOUNTER
Pt asking if she can repeat her urinalysis to see if there is still blood in urine before seeing urology  Se note below also please

## 2021-07-28 NOTE — TELEPHONE ENCOUNTER
KEVAN to ask pt why she was requesting urinalysis  Is she having any Symptoms?   Then will forward to Dr Marianela Schaefer

## 2021-07-28 NOTE — TELEPHONE ENCOUNTER
----- Message from Emmanuel Garza sent at 7/28/2021 12:24 PM EDT -----  Subject: Message to Provider    QUESTIONS  Information for Provider? Pt wanted to let the DR know that she is seeing   Dr. Jaime Dooley ([facility]) in a week. She is still experiencing   tachycardia. She was wondering if she could also get an order for a   urinalysis? Also can she now take Tylenol for pain?  ---------------------------------------------------------------------------  --------------  CALL BACK INFO  What is the best way for the office to contact you? OK to leave message on   voicemail  Preferred Call Back Phone Number? [phone]  ---------------------------------------------------------------------------  --------------  SCRIPT ANSWERS  Relationship to Patient?  Self

## 2021-07-28 NOTE — TELEPHONE ENCOUNTER
Patient notified labs ordered  Patient will  labs    Patient is also requesting response to other questions see below   Patient unable to see cardiologist for another week and still having tachycardia    Patient is asking if she can take tylenol    Please advise

## 2021-07-28 NOTE — TELEPHONE ENCOUNTER
----- Message from Lian Kerr sent at 7/28/2021 12:24 PM EDT -----  Subject: Message to Provider    QUESTIONS  Information for Provider? Pt wanted to let the DR know that she is seeing   Dr. Corbin Palmer ([facility]) in a week. She is still experiencing   tachycardia. She was wondering if she could also get an order for a   urinalysis? Also can she now take Tylenol for pain?  ---------------------------------------------------------------------------  --------------  CALL BACK INFO  What is the best way for the office to contact you? OK to leave message on   voicemail  Preferred Call Back Phone Number? [phone]  ---------------------------------------------------------------------------  --------------  SCRIPT ANSWERS  Relationship to Patient?  Self

## 2021-08-02 ENCOUNTER — APPOINTMENT (OUTPATIENT)
Dept: GENERAL RADIOLOGY | Age: 66
End: 2021-08-02
Attending: ANESTHESIOLOGY
Payer: COMMERCIAL

## 2021-08-02 ENCOUNTER — HOSPITAL ENCOUNTER (OUTPATIENT)
Age: 66
Setting detail: OUTPATIENT SURGERY
Discharge: HOME | End: 2021-08-02
Attending: ANESTHESIOLOGY | Admitting: ANESTHESIOLOGY
Payer: COMMERCIAL

## 2021-08-02 VITALS
HEIGHT: 67 IN | BODY MASS INDEX: 24.64 KG/M2 | WEIGHT: 157 LBS | RESPIRATION RATE: 16 BRPM | HEART RATE: 69 BPM | OXYGEN SATURATION: 96 % | TEMPERATURE: 98 F | DIASTOLIC BLOOD PRESSURE: 70 MMHG | SYSTOLIC BLOOD PRESSURE: 104 MMHG

## 2021-08-02 PROBLEM — M51.26 LUMBAR DISC HERNIATION: Chronic | Status: ACTIVE | Noted: 2021-07-14

## 2021-08-02 PROCEDURE — 6360000002 HC RX W HCPCS: Performed by: ANESTHESIOLOGY

## 2021-08-02 PROCEDURE — 99152 MOD SED SAME PHYS/QHP 5/>YRS: CPT | Performed by: ANESTHESIOLOGY

## 2021-08-02 PROCEDURE — 3209999900 FLUORO FOR SURGICAL PROCEDURES

## 2021-08-02 PROCEDURE — 2709999900 HC NON-CHARGEABLE SUPPLY: Performed by: ANESTHESIOLOGY

## 2021-08-02 PROCEDURE — 3600000050 HC PAIN LEVEL 1 BASE: Performed by: ANESTHESIOLOGY

## 2021-08-02 PROCEDURE — 2500000003 HC RX 250 WO HCPCS: Performed by: ANESTHESIOLOGY

## 2021-08-02 PROCEDURE — 7100000010 HC PHASE II RECOVERY - FIRST 15 MIN: Performed by: ANESTHESIOLOGY

## 2021-08-02 PROCEDURE — 7100000011 HC PHASE II RECOVERY - ADDTL 15 MIN: Performed by: ANESTHESIOLOGY

## 2021-08-02 PROCEDURE — 6360000004 HC RX CONTRAST MEDICATION: Performed by: ANESTHESIOLOGY

## 2021-08-02 PROCEDURE — 64483 NJX AA&/STRD TFRM EPI L/S 1: CPT | Performed by: ANESTHESIOLOGY

## 2021-08-02 PROCEDURE — 64484 NJX AA&/STRD TFRM EPI L/S EA: CPT | Performed by: ANESTHESIOLOGY

## 2021-08-02 RX ORDER — SODIUM CHLORIDE 9 MG/ML
25 INJECTION, SOLUTION INTRAVENOUS PRN
Status: DISCONTINUED | OUTPATIENT
Start: 2021-08-02 | End: 2021-08-02 | Stop reason: HOSPADM

## 2021-08-02 RX ORDER — FENTANYL CITRATE 50 UG/ML
INJECTION, SOLUTION INTRAMUSCULAR; INTRAVENOUS PRN
Status: DISCONTINUED | OUTPATIENT
Start: 2021-08-02 | End: 2021-08-02 | Stop reason: ALTCHOICE

## 2021-08-02 RX ORDER — SODIUM CHLORIDE 0.9 % (FLUSH) 0.9 %
5-40 SYRINGE (ML) INJECTION EVERY 12 HOURS SCHEDULED
Status: DISCONTINUED | OUTPATIENT
Start: 2021-08-02 | End: 2021-08-02 | Stop reason: HOSPADM

## 2021-08-02 RX ORDER — MIDAZOLAM HYDROCHLORIDE 1 MG/ML
INJECTION INTRAMUSCULAR; INTRAVENOUS PRN
Status: DISCONTINUED | OUTPATIENT
Start: 2021-08-02 | End: 2021-08-02 | Stop reason: ALTCHOICE

## 2021-08-02 RX ORDER — SODIUM CHLORIDE 0.9 % (FLUSH) 0.9 %
5-40 SYRINGE (ML) INJECTION PRN
Status: DISCONTINUED | OUTPATIENT
Start: 2021-08-02 | End: 2021-08-02 | Stop reason: HOSPADM

## 2021-08-02 RX ORDER — DEXAMETHASONE SODIUM PHOSPHATE 10 MG/ML
INJECTION INTRAMUSCULAR; INTRAVENOUS PRN
Status: DISCONTINUED | OUTPATIENT
Start: 2021-08-02 | End: 2021-08-02 | Stop reason: ALTCHOICE

## 2021-08-02 RX ORDER — LIDOCAINE HYDROCHLORIDE 10 MG/ML
INJECTION, SOLUTION EPIDURAL; INFILTRATION; INTRACAUDAL; PERINEURAL PRN
Status: DISCONTINUED | OUTPATIENT
Start: 2021-08-02 | End: 2021-08-02 | Stop reason: ALTCHOICE

## 2021-08-02 ASSESSMENT — PAIN - FUNCTIONAL ASSESSMENT: PAIN_FUNCTIONAL_ASSESSMENT: 0-10

## 2021-08-02 ASSESSMENT — PAIN SCALES - GENERAL
PAINLEVEL_OUTOF10: 2
PAINLEVEL_OUTOF10: 5
PAINLEVEL_OUTOF10: 2

## 2021-08-02 ASSESSMENT — PAIN DESCRIPTION - LOCATION: LOCATION: BACK

## 2021-08-02 ASSESSMENT — PAIN DESCRIPTION - PAIN TYPE: TYPE: CHRONIC PAIN

## 2021-08-02 NOTE — H&P
daily     Historical Provider, MD   Ascorbic Acid (VITAMIN C) 250 MG tablet Take 250 mg by mouth daily     Historical Provider, MD   Coenzyme Q10 (CO Q 10 PO) Take by mouth     Historical Provider, MD   Cholecalciferol (VITAMIN D3) 30 MCG/15ML LIQD Take by mouth     Historical Provider, MD   atorvastatin (LIPITOR) 10 MG tablet TAKE ONE TABLET BY MOUTH TWICE A WEEK 12/16/20   M[facility] Lancaster MD   fluticasone (FLONASE) 50 MCG/ACT nasal spray USE 1 SPRAY NASALLY DAILY 8/13/20   [facility] Lancaster MD   Cetirizine HCl (ZYRTEC ALLERGY PO) Take 1 tablet by mouth daily     Historical Provider, MD         This is a 60 y.o. female who is pleasant, cooperative, alert and oriented x3, in no acute distress. Heart: Heart sounds are normal.  HR regular rate and rhythm without murmur, OCCASIONAL PVC 4/30 SECONDS. NO gallop or rub. Lungs: Normal respiratory effort with equal expansion, good air exchange, unlabored and clear to auscultation without wheezes or rales bilaterally   Abdomen: soft, nontender, nondistended with bowel sounds AUDIBLE X 4   PEDAL PULSES + 2 BILATERALLY NO CALF TENDERNESS , NO EDEMA. Labs:  Recent Labs     07/27/21  0012 07/23/21  1022 07/15/21  1056 07/15/21  1056   HGB 13.0  --    < > 10.7*   HCT 41.3  --    < > 34.0*   WBC 7.1  --    < > 7.5   MCV 96.3  --    < > 96.3   *  --    < > 282     --    < > 134*   K 3.7  --    < > 3.7   CL 98  --    < > 99   CO2 24  --    < > 20   BUN 8  --    < > 7*   CREATININE 0.52  --    < > 0.44*   GLUCOSE 116*  --    < > 105*   AST  --  22   < > 99*   ALT  --  57*   < > 147*   LABALBU  --   --   --  3.7    < > = values in this interval not displayed. No results for input(s): COVID19 in the last 720 hours.     SACHIN Estrella - CNP   Electronically signed 8/2/2021 at 12:22 PM        Physician   Specialty:  Pain Management   Progress Notes      Signed   Encounter Date:  7/14/2021         Related encounter: Initial consult from 7/14/2021 in [facility] [place]         Signed        Expand AllCollapse All     Show:Clear all  [x]Manual[x]Template[x]Copied    Added by:  Dwight Francois MD    []Jacinto for details   The patient is a 60 y. o. Non-/non  female. Chief Complaint   Patient presents with    Consultation    Back Pain         HPI     Requesting physician for the evaluation of Reinaldo Noble 1955: Mela Cameron MD     Pain History  43-year-old pleasant female with almost 6-month history of back pain  Symptoms started after a long drive to [place]  Her pain is predominantly located in the axial lower lumbar area as aching nagging stiffness that was progressively worsening  6 weeks back she developed severe flareup of her back pain without any particular injury  Now she has developed radiation of pain down left leg over hip thigh and anterior leg  Pain aggravated with lifting bending twisting turning  Nothing seems to alleviate the pain  Described the pain is sharp shooting throbbing burning sensation  No changes in bladder or bowel control     She been doing physical therapy have attended several sessions not noticed too much improvement  Have tried anti-inflammatory medication gabapentin tizanidine and tramadol  Pain is severe markedly disabling affecting quality of life  Had recent MRI lumbar spine     No previous lumbar spine injection history  No previous lumbar spine surgical history  Pain score today 7  1. Location:lower back   2. Radiation:left foot , shin and gluteal pain the worst  3. Character:aching penetrating nagging throbbing sharp  5. Duration:hours  6. Onset:04/04/21   7. Did an injury cause pain: no  8. Aggravating factors:nothing  9. Alleviating factors: medications,ice  10. Associated symptoms (numbness / tingling / weakness):  no  -Where at:n/a  -Down into finger tips or toes (specify which finger or toes):n/a  -constant or intermitting: -  11.  Red Flags: (weight loss / chills / loss of bladder or bowel control):no     Previous management history  1. Previous diagnostic workup: (Imaging/EMG)   CT, MRI, or Xray: MRI  What part of the body:back and hip  What facility did they have it at: [facility]   What year or specific date: 06/2021  EMG:  no     2. Previous non interventional treatments tried:  chiropractor or physical therapy: chiropractor, physical therapy  What part of the body:chiropractor hips,physical therapy lower back and left hip  What facility was it done at: chiropractor- [facility] [place], physical therapy-[facility]  How long ago was it last tried: may 20 chiropractor, physical therapy last visit 7/13/21  Did it work:chiropractor-no, physical therapy-yes  Did they complete it: chiropractor completed physical therapy is current     3. Previous Medications tried  NSAID's: yes  Neurontin: yes  Lyrica: no  Trycyclic antidepressant (Ellavil / Pamelor ): no  Cymbalta: no  Opioids (Ultram / Vicodin / Percocet / Morphine / Dilaudid / Oramorph/ Fentanyl etc.):percocet  Last Pain medication taken (name of med and date):ibprofin, tylenol gabapentin 7/14/21     4. Previous Interventional pain procedures tried:no  What kind of injection:  Who did the injection:  did the injection help:   Last time injection was done:     5.  Previous surgeries for pain: no  What part of the body did they have the surgery:  What physician did the surgery:  What Facility did they have the surgery done:  Date of Surgery:     Social History:  Marital status:    Employment History:not employed  Working  - Full time Or Part time: -  Disability no  Legal Issues related to pain complaint: no     Pain Disability Index score : 68     No results found for: LABA1C  No results found for: EAG        Informant: patient           Past Medical History        Past Medical History:   Diagnosis Date    Asthma      DDD (degenerative disc disease), lumbar 7/14/2021    IBS (irritable bowel syndrome)      Left lumbar radiculitis 7/14/2021    Panic disorder           Past Surgical History         Past Surgical History:   Procedure Laterality Date    CYST INCISION AND DRAINAGE Right 11/1997    NASAL POLYP SURGERY             Social History   Social History            Socioeconomic History    Marital status:        Spouse name: None    Number of children: None    Years of education: None    Highest education level: None   Occupational History    None   Tobacco Use    Smoking status: Never Smoker    Smokeless tobacco: Never Used   Substance and Sexual Activity    Alcohol use: Yes       Alcohol/week: 7.0 standard drinks       Types: 7 Glasses of wine per week       Comment: 1-2 a few nights a week    Drug use: No    Sexual activity: None   Other Topics Concern    None   Social History Narrative    None      Social Determinants of Health          Financial Resource Strain: Low Risk     Difficulty of Paying Living Expenses: Not hard at all   Food Insecurity: No Food Insecurity    Worried About Running Out of Food in the Last Year: Never true    Shantel of Food in the Last Year: Never true   Transportation Needs:     Lack of Transportation (Medical):      Lack of Transportation (Non-Medical):    Physical Activity:     Days of Exercise per Week:     Minutes of Exercise per Session:    Stress:     Feeling of Stress :    Social Connections:     Frequency of Communication with Friends and Family:     Frequency of Social Gatherings with Friends and Family:     Attends Temple Services:     Active Member of Clubs or Organizations:     Attends Club or Organization Meetings:     Marital Status:    Intimate Partner Violence:     Fear of Current or Ex-Partner:     Emotionally Abused:     Physically Abused:     Sexually Abused:          Family History         Family History   Problem Relation Age of Onset    High Blood Pressure Mother      High Blood Pressure Father      Cancer Father           melanoma    Heart Disease Maternal Grandfather                Allergies   Allergen Reactions    Pcn [Penicillins]      Sulfa Antibiotics        Pcn [penicillins] and Sulfa antibiotics       Vitals:     07/14/21 1109   BP: 111/74   Pulse: 75   Resp: 15   SpO2: 97%      Current Facility-Administered Medications          Current Outpatient Medications   Medication Sig Dispense Refill    Elastic Bandages & Supports (LUMBAR BACK BRACE/SUPPORT PAD) MISC 1 Units by Does not apply route daily Pneumatic offloading back brace 1 each 0    meloxicam (MOBIC) 15 MG tablet Take 1 tablet by mouth daily 30 tablet 0    beclomethasone (QVAR) 80 MCG/ACT inhaler INHALE 1 PUFF ORALLY TWICE DAILY 16 Inhaler 5    gabapentin (NEURONTIN) 300 MG capsule Take 1 capsule by mouth 3 times daily for 90 days. 90 capsule 0    tiZANidine (ZANAFLEX) 4 MG tablet Take 1 tablet by mouth 2 times daily for 20 days 40 tablet 0    traMADol (ULTRAM) 50 MG tablet Take 1 tablet by mouth every 8 hours as needed for Pain for up to 10 days.  30 tablet 0    ibuprofen (ADVIL;MOTRIN) 200 MG tablet Take 3 tablets by mouth every 8 hours as needed for Pain or Fever 30 tablet 0    Multiple Vitamins-Minerals (THERAPEUTIC MULTIVITAMIN-MINERALS) tablet Take 1 tablet by mouth daily        Ascorbic Acid (VITAMIN C) 250 MG tablet Take 250 mg by mouth daily        Coenzyme Q10 (CO Q 10 PO) Take by mouth        Cholecalciferol (VITAMIN D3) 30 MCG/15ML LIQD Take by mouth        fluticasone (FLONASE) 50 MCG/ACT nasal spray USE 1 SPRAY NASALLY DAILY 1 Bottle 5    Cetirizine HCl (ZYRTEC ALLERGY PO) Take 1 tablet by mouth daily        QVAR REDIHALER 80 MCG/ACT AERB inhaler          tiZANidine (ZANAFLEX) 2 MG tablet Take 1 tablet by mouth nightly as needed (back spasms or pain) (Patient not taking: Reported on 7/14/2021) 10 tablet 0    atorvastatin (LIPITOR) 10 MG tablet TAKE ONE TABLET BY MOUTH TWICE A WEEK (Patient not slow and antalgic  Assessment & Plan      Pain History  29-year-old pleasant female with almost 6-month history of back pain  Symptoms started after a long drive to [place]  Her pain is predominantly located in the axial lower lumbar area as aching nagging stiffness that was progressively worsening  6 weeks back she developed severe flareup of her back pain without any particular injury  Now she has developed radiation of pain down left leg over hip thigh and anterior leg  Pain aggravated with lifting bending twisting turning  Nothing seems to alleviate the pain  Described the pain is sharp shooting throbbing burning sensation  No changes in bladder or bowel control     She been doing physical therapy have attended several sessions not noticed too much improvement  Have tried anti-inflammatory medication gabapentin tizanidine and tramadol  Pain is severe markedly disabling affecting quality of life  Had recent MRI lumbar spine     No previous lumbar spine injection history  No previous lumbar spine surgical history  Pain score today 7     EXAMINATION: MRI OF THE LUMBAR SPINE WITHOUT CONTRAST, 6/16/2021 1:44 pm    L3-L4: There is a disc bulge measuring 3-4 mm. Disc and/or osteophytes result in moderate narrowing of the neural foramina bilaterally. There is mild facet and ligamentum flavum hypertrophy. The thecal sac is not stenotic. L4-L5: There is a disc extrusion toward the left from L4-5 extending superiorly behind the inferior endplate of L4 and causing severe narrowing of the left neural foramen. The thecal sac is not stenotic. There is mild narrowing of the right neural foramen. L5-S1:  There is mild facet and ligamentum flavum hypertrophy. Disc and/or osteophytes cause minimal narrowing of the neural foramina bilaterally. The thecal sac is not stenotic. The S1 nerve roots are intact.    Impression Disc extrusion toward the left at L4-5 extending superiorly behind the inferior endplate of L4 causing severe narrowing of the left neural foramen. Narrowing of the neural foramina throughout the lumbar region as discussed above. No stenosis of the thecal sac in the lumbar region. 1. Left lumbar radiculitis    2. Lumbar disc herniation    3.  DDD (degenerative disc disease), lumbar            Orders Placed This Encounter   Procedures    MA NJX AA&/STRD TFRML EPI LUMBAR/SACRAL 1 LEVEL      Encounter Medications         Orders Placed This Encounter   Medications    Elastic Bandages & Supports (LUMBAR BACK BRACE/SUPPORT PAD) MISC       Si Units by Does not apply route daily Pneumatic offloading back brace       Dispense:  1 each       Refill:  0    meloxicam (MOBIC) 15 MG tablet       Sig: Take 1 tablet by mouth daily       Dispense:  30 tablet       Refill:  0         MRI lumbar spine  Report was reviewed  Images reviewed independently  Images were shown to the patient  Pertinent finding discussed with patient  Modic changes in L3 and L4 vertebrae's  I think at this account for her axial back pain that was going on for 6 months  L4-L5 level left-sided disc herniation with impingement of the exiting left L4 nerve root  I think this accounts for her severe flareup of low back pain with left-sided sciatica and associated with depressed left knee reflex        I will advise patient to continue core strengthening exercise  Will advised to continue inversion table use  Will order offloading lumbar back brace  We will discontinue Motrin and will start on meloxicam  We will schedule for lumbar epidural steroid injection     Consultation note sent to the referring physician  Electronically signed by Cherelle Garcia MD on 2021 at 12:09 PM               Revision History

## 2021-08-02 NOTE — OP NOTE
Operative Note      Patient: Bebeto Thomas  YOB: 1955  MRN: [id number]    Date of Procedure: 8/2/2021    Pre-Op Diagnosis: DX LEFT LUMBAR RADICULITIS   LUMBAR [address] HERNIATION    Post-Op Diagnosis: Same       Procedure(s):  LEFT L4 AND L5 EPIDURAL STEROID INJECTION    Surgeon(s):  Ana Luisa Burden MD    Assistant:   * No surgical staff found *    Anesthesia: IV Sedation    Estimated Blood Loss (mL): Minimal    Complications: None    Specimens:   * No specimens in log *    Implants:  * No implants in log *      Drains: * No LDAs found *    Findings: N/A    Detailed Description of Procedure:     Patient Name: Bebeto Thomas   YOB: 1955  Room/Bed: STAZ OR Pool/NONE  Medical Record Number: [id number]  Date: 8/2/2021       Preoperative Diagnosis:    Lumbar disc herniation  Left lumbar radiculitis    Postoperative diagnosis:   Lumbar disc herniation  Left lumbar radiculitis    Blood Loss: none    Procedure Performed:  Transforaminal lumbar epidural steroid injection at the levels of L4 and L5 on the Left side under fluoroscopic guidance. Procedure: The Patient was seen in the preop area, chart was reviewed, informed consent was obtained. Patient was taken to procedure room and was placed in prone position. Vital signs were monitored through out the  Procedure. A time out was completed. The skin over the back was prepped and draped in sterile manner. The target point was marked in ipsilateral obliques just below the pedicle at the target levels. Skin and deep tissues were anesthetized with 1 % lidocaine. A 20-gauge, spinal needle was advanced  under fluoroscopy guidance in AP / Oblique and lateral views, such that the tip of the needle lies in the neuroforamina at about the 6 o'clock position under the pedicle of the target vertebra. This was confirmed with AP and lateral views of the fluoroscopy.      Then after negative aspiration contrast dye Omnipaque-180 was injected with live fluoroscopy

## 2021-08-06 ENCOUNTER — OFFICE VISIT (OUTPATIENT)
Dept: INTERNAL MEDICINE CLINIC | Age: 66
End: 2021-08-06
Payer: COMMERCIAL

## 2021-08-06 VITALS
SYSTOLIC BLOOD PRESSURE: 118 MMHG | HEART RATE: 89 BPM | BODY MASS INDEX: 24.64 KG/M2 | RESPIRATION RATE: 24 BRPM | OXYGEN SATURATION: 99 % | HEIGHT: 67 IN | WEIGHT: 157 LBS | DIASTOLIC BLOOD PRESSURE: 78 MMHG | TEMPERATURE: 98.2 F

## 2021-08-06 DIAGNOSIS — M54.9 INTRACTABLE BACK PAIN: ICD-10-CM

## 2021-08-06 DIAGNOSIS — L23.2 ALLERGIC CONTACT DERMATITIS DUE TO COSMETICS: ICD-10-CM

## 2021-08-06 DIAGNOSIS — R79.89 ABNORMAL LFTS: Primary | ICD-10-CM

## 2021-08-06 DIAGNOSIS — R31.9 HEMATURIA, UNSPECIFIED TYPE: ICD-10-CM

## 2021-08-06 PROCEDURE — 99214 OFFICE O/P EST MOD 30 MIN: CPT | Performed by: INTERNAL MEDICINE

## 2021-08-06 RX ORDER — CLOTRIMAZOLE AND BETAMETHASONE DIPROPIONATE 10; .64 MG/G; MG/G
CREAM TOPICAL
Qty: 45 G | Refills: 0 | Status: SHIPPED | OUTPATIENT
Start: 2021-08-06

## 2021-08-06 ASSESSMENT — PATIENT HEALTH QUESTIONNAIRE - PHQ9
2. FEELING DOWN, DEPRESSED OR HOPELESS: 0
SUM OF ALL RESPONSES TO PHQ QUESTIONS 1-9: 0
SUM OF ALL RESPONSES TO PHQ9 QUESTIONS 1 & 2: 0
1. LITTLE INTEREST OR PLEASURE IN DOING THINGS: 0
SUM OF ALL RESPONSES TO PHQ QUESTIONS 1-9: 0
SUM OF ALL RESPONSES TO PHQ QUESTIONS 1-9: 0

## 2021-08-06 NOTE — PROGRESS NOTES
Reinaldo Noble is a 73 y.o. female who presents for   Chief Complaint   Patient presents with    1 Month Follow-Up     had appt with cardiologist 8/4/21; still having some back pain    Health Maintenance     hep c, hiv, shingles, dexa, cervical, covid    and follow up of chronic medical problems. Patient Active Problem List   Diagnosis    IBS (irritable bowel syndrome)    Panic disorder    Chest pain    Lumbar disc herniation    Left lumbar radiculitis    DDD (degenerative disc disease), lumbar     HPI  Here for follow-up on back pain blood in the urine and elevated liver enzymes and palpitations and patient doing better now    Current Outpatient Medications   Medication Sig Dispense Refill    clotrimazole-betamethasone (LOTRISONE) 1-0.05 % cream Apply topically 2 times daily. 45 g 0    Elastic Bandages & Supports (LUMBAR BACK BRACE/SUPPORT PAD) MISC 1 Units by Does not apply route daily Pneumatic offloading back brace 1 each 0    beclomethasone (QVAR) 80 MCG/ACT inhaler INHALE 1 PUFF ORALLY TWICE DAILY 16 Inhaler 5    Multiple Vitamins-Minerals (THERAPEUTIC MULTIVITAMIN-MINERALS) tablet Take 1 tablet by mouth daily       Ascorbic Acid (VITAMIN C) 250 MG tablet Take 250 mg by mouth daily       Coenzyme Q10 (CO Q 10 PO) Take by mouth       Cholecalciferol (VITAMIN D3) 30 MCG/15ML LIQD Take by mouth       fluticasone (FLONASE) 50 MCG/ACT nasal spray USE 1 SPRAY NASALLY DAILY 1 Bottle 5    Cetirizine HCl (ZYRTEC ALLERGY PO) Take 1 tablet by mouth daily       QVAR REDIHALER 80 MCG/ACT AERB inhaler  (Patient not taking: Reported on 8/6/2021)      meloxicam (MOBIC) 15 MG tablet Take 1 tablet by mouth daily (Patient not taking: Reported on 8/6/2021) 30 tablet 0    gabapentin (NEURONTIN) 300 MG capsule Take 1 capsule by mouth 3 times daily for 90 days.  (Patient not taking: Reported on 8/6/2021) 90 capsule 0    ibuprofen (ADVIL;MOTRIN) 200 MG tablet Take 3 tablets by mouth every 8 hours as needed for Pain or Fever (Patient not taking: Reported on 8/6/2021) 30 tablet 0    tiZANidine (ZANAFLEX) 2 MG tablet Take 1 tablet by mouth nightly as needed (back spasms or pain) (Patient not taking: Reported on 8/6/2021) 10 tablet 0    atorvastatin (LIPITOR) 10 MG tablet TAKE ONE TABLET BY MOUTH TWICE A WEEK (Patient not taking: Reported on 8/6/2021) 22 tablet 0     No current facility-administered medications for this visit.        Allergies   Allergen Reactions    Pcn [Penicillins]     Sulfa Antibiotics        Past Medical History:   Diagnosis Date    Asthma     DDD (degenerative disc disease), lumbar 7/14/2021    Hyperlipidemia     IBS (irritable bowel syndrome)     Left lumbar radiculitis 7/14/2021    Panic disorder        Past Surgical History:   Procedure Laterality Date    COLONOSCOPY      CYST INCISION AND DRAINAGE Right 11/1997    ENDOSCOPY, COLON, DIAGNOSTIC      NASAL POLYP SURGERY      OTHER SURGICAL HISTORY  08/02/2021    steroid injection    PAIN MANAGEMENT PROCEDURE Left 8/2/2021    LEFT L4 AND L5 EPIDURAL STEROID INJECTION performed by Everardo Batista MD at [address] [place] [address] History   Problem Relation Age of Onset    High Blood Pressure Mother     High Blood Pressure Father     Cancer Father         melanoma    Heart Disease Maternal Grandfather      ROS   Constitutional:  Negative for fatigue, loss of appetite and unexpected weight change   HEENT            : Negative for neck stiffness and pain, no congestion or sinus pressure   Eyes                : No visual disturbance or pain   Cardiovascular: No chest pain or palpitations or leg swelling   Respiratory      : Negative for cough, shortness of breath or wheezing   Gastrointestinal: Negative for abdominal pain, constipation or diarrhea and bloating No nausea or vomiting   Genitourinary:     No urgency or frequency, no burning or hematuria   Musculoskeletal: Positive for arthralgias, back pain or myalgias   Skin : Negative for rash or erythema   Neurological    : Negative for dizziness, weakness, tremors ,light headedness or syncope   Psychiatric       : Negative for dysphoric mood, sleep disturbances, nervous or anxious, or decreased concentration   All other review of systems was negative    Objective  Physical Examination:    Nursing note reviewed    /78 (Site: Right Upper Arm, Position: Sitting, Cuff Size: Medium Adult)   Pulse 89   Temp 98.2 °F (36.8 °C) (Temporal)   Resp 24   Ht 5' 7.01\" (1.702 m)   Wt 157 lb (71.2 kg)   LMP 01/01/2001 (LMP Unknown)   SpO2 99%   Breastfeeding No   BMI 24.58 kg/m²   BP Readings from Last 3 Encounters:   08/06/21 118/78   08/02/21 104/70   07/26/21 133/89         Constitutional:  Claude Gifford is oriented to place, person and time ,appears well-developed and well-nourished  HEENT:  Atraumatic and normocephalic, external ears normal bilaterally, nose normal no oropharyngeal exudate and is clear and moist  Eyes:  EOCM normal; conjunctivae normal; PERRLA bilaterally  Neck:  Normal range of motion, neck supple, no JVD and no thyromegaly  Cardiovascular:  RRR, normal heart sounds and intact distal pulses  Pulmonary:  effort normal and breath sounds normal bilaterally,no wheezes or rales, no respiratory distress  Abdominal:  Soft, non-tender; normal bowel sounds, no masses  Musculoskeletal: Limited range of motion and no edema or tenderness bilaterally  No lymphadenopathy  Neurological:  alert, oriented, and normal reflexes bilaterally  Skin: warm and dry  Psychiatric:  normal mood and effect; behavior normal.    Labs:   No results found for: LABA1C  Lab Results   Component Value Date    CHOL 189 07/15/2021     Lab Results   Component Value Date    HDL 39 (L) 07/15/2021     Lab Results   Component Value Date    LDLCALC 128 11/16/2020     Lab Results   Component Value Date    TRIG 96 07/15/2021     No components found for: [place], [place]  Lab Results   Component Value Date    WBC 7.1 07/27/2021    HGB 13.0 07/27/2021    HCT 41.3 07/27/2021    MCV 96.3 07/27/2021     (H) 07/27/2021     Lab Results   Component Value Date    INR 0.9 01/03/2018    PROTIME 9.7 01/03/2018     Lab Results   Component Value Date    GLUCOSE 116 (H) 07/27/2021    CREATININE 0.52 07/27/2021    BUN 8 07/27/2021     07/27/2021    K 3.7 07/27/2021    CL 98 07/27/2021    CO2 24 07/27/2021     Lab Results   Component Value Date    ALT 57 (H) 07/23/2021    AST 22 07/23/2021    ALKPHOS 196 (H) 07/15/2021    BILITOT 0.37 07/15/2021     Lab Results   Component Value Date    LABALBU 3.7 07/15/2021     Lab Results   Component Value Date    TSH 2.01 07/27/2021     Assessment:  1. Abnormal LFTs    2. Allergic contact dermatitis due to cosmetics    3. Hematuria, unspecified type    4. Intractable back pain        Plan:  Patient's urinalysis remain normal now and culture was negative and I explained to the patient that even though the urine was negative now we will may need to keep an eye on that and still advised to see the urologist but as currently stable we will repeat the urinalysis  Patient's visit to the cardiologist and reports reviewed and patient will be getting a Holter monitor and echocardiogram on 17 August  Patient had epidural injection 2 days back and seeing some improvement and will be having second injection on 16 and patient taking only Tylenol as needed  Patient's liver tests were normal and patient had evaluation for gallbladder stones in the past and saw the surgeon and as currently patient is stable and have no symptoms and labs are within normal limits will not pursued any intervention and repeat labs again in 4 weeks  Patient has a rash which looks like a contact dermatitis under the armpits bilaterally and patient is given Lotrisone cream and advised to avoid any sprays and call back if no improvement  Review in 3 months           Mitesh Evans received counseling on the following healthy behaviors: nutrition and exercise    2. Prior labs and health maintenance reviewed. 3.  Discussed use, benefit, and side effects of prescribed medications. Barriers to medication compliance addressed. All her questions were answered. Pt voiced understanding. Chey Ambrosio will continue current medications, diet and exercise. Orders Placed This Encounter   Medications    clotrimazole-betamethasone (LOTRISONE) 1-0.05 % cream     Sig: Apply topically 2 times daily. Dispense:  45 g     Refill:  0          Completed Refills               Requested Prescriptions     Signed Prescriptions Disp Refills    clotrimazole-betamethasone (LOTRISONE) 1-0.05 % cream 45 g 0     Sig: Apply topically 2 times daily. 4. Patient given educational materials - see patient instructions    5. Was a self-tracking handout given in paper form or via [employer]? NO    Orders Placed This Encounter   Procedures    Urinalysis Reflex to Culture     Standing Status:   Future     Standing Expiration Date:   8/6/2022     Order Specific Question:   SPECIFY(EX-CATH,MIDSTREAM,CYSTO,ETC)? Answer:   mid stream    Comprehensive Metabolic Panel     Standing Status:   Future     Standing Expiration Date:   8/6/2022     Return in about 3 months (around 11/6/2021). Patient voiced understanding and agreed to treatment plan. Electronically signed by Adina Canales MD on 8/6/2021 at 11:07 AM    This note is created with a voice recognition program and while intend to generate a document that accurately reflects the content of the visit, no guarantee can be provided that every mistake has been identified and corrected by editing.

## 2021-08-16 ENCOUNTER — HOSPITAL ENCOUNTER (OUTPATIENT)
Dept: PAIN MANAGEMENT | Facility: CLINIC | Age: 66
Discharge: HOME | End: 2021-08-16
Payer: COMMERCIAL

## 2021-08-16 VITALS
RESPIRATION RATE: 10 BRPM | DIASTOLIC BLOOD PRESSURE: 58 MMHG | SYSTOLIC BLOOD PRESSURE: 109 MMHG | OXYGEN SATURATION: 96 % | HEART RATE: 67 BPM | BODY MASS INDEX: 24.64 KG/M2 | TEMPERATURE: 98.2 F | HEIGHT: 67 IN | WEIGHT: 157 LBS

## 2021-08-16 VITALS
RESPIRATION RATE: 14 BRPM | HEART RATE: 76 BPM | OXYGEN SATURATION: 99 % | DIASTOLIC BLOOD PRESSURE: 76 MMHG | SYSTOLIC BLOOD PRESSURE: 115 MMHG

## 2021-08-16 DIAGNOSIS — R52 PAIN MANAGEMENT: ICD-10-CM

## 2021-08-16 PROCEDURE — 2500000003 HC RX 250 WO HCPCS: Performed by: ANESTHESIOLOGY

## 2021-08-16 PROCEDURE — 64484 NJX AA&/STRD TFRM EPI L/S EA: CPT | Performed by: ANESTHESIOLOGY

## 2021-08-16 PROCEDURE — 64483 NJX AA&/STRD TFRM EPI L/S 1: CPT

## 2021-08-16 PROCEDURE — 64483 NJX AA&/STRD TFRM EPI L/S 1: CPT | Performed by: ANESTHESIOLOGY

## 2021-08-16 PROCEDURE — 64484 NJX AA&/STRD TFRM EPI L/S EA: CPT

## 2021-08-16 PROCEDURE — 99152 MOD SED SAME PHYS/QHP 5/>YRS: CPT | Performed by: ANESTHESIOLOGY

## 2021-08-16 PROCEDURE — 6360000002 HC RX W HCPCS: Performed by: ANESTHESIOLOGY

## 2021-08-16 PROCEDURE — 6360000004 HC RX CONTRAST MEDICATION: Performed by: ANESTHESIOLOGY

## 2021-08-16 RX ORDER — LIDOCAINE HYDROCHLORIDE 10 MG/ML
INJECTION, SOLUTION EPIDURAL; INFILTRATION; INTRACAUDAL; PERINEURAL
Status: COMPLETED | OUTPATIENT
Start: 2021-08-16 | End: 2021-08-16

## 2021-08-16 RX ORDER — MIDAZOLAM HYDROCHLORIDE 2 MG/2ML
INJECTION, SOLUTION INTRAMUSCULAR; INTRAVENOUS
Status: COMPLETED | OUTPATIENT
Start: 2021-08-16 | End: 2021-08-16

## 2021-08-16 RX ORDER — DEXAMETHASONE SODIUM PHOSPHATE 10 MG/ML
INJECTION, SOLUTION INTRAMUSCULAR; INTRAVENOUS
Status: COMPLETED | OUTPATIENT
Start: 2021-08-16 | End: 2021-08-16

## 2021-08-16 RX ORDER — FENTANYL CITRATE 50 UG/ML
INJECTION, SOLUTION INTRAMUSCULAR; INTRAVENOUS
Status: COMPLETED | OUTPATIENT
Start: 2021-08-16 | End: 2021-08-16

## 2021-08-16 RX ADMIN — IOHEXOL 10 ML: 180 INJECTION INTRAVENOUS at 15:52

## 2021-08-16 RX ADMIN — DEXAMETHASONE SODIUM PHOSPHATE 10 MG: 10 INJECTION, SOLUTION INTRAMUSCULAR; INTRAVENOUS at 15:52

## 2021-08-16 RX ADMIN — LIDOCAINE HYDROCHLORIDE 3 ML: 10 INJECTION, SOLUTION EPIDURAL; INFILTRATION; INTRACAUDAL at 15:53

## 2021-08-16 RX ADMIN — FENTANYL CITRATE 50 MCG: 50 INJECTION INTRAMUSCULAR; INTRAVENOUS at 15:51

## 2021-08-16 RX ADMIN — MIDAZOLAM HYDROCHLORIDE 1 MG: 1 INJECTION, SOLUTION INTRAMUSCULAR; INTRAVENOUS at 15:51

## 2021-08-16 ASSESSMENT — PAIN - FUNCTIONAL ASSESSMENT
PAIN_FUNCTIONAL_ASSESSMENT: 0-10
PAIN_FUNCTIONAL_ASSESSMENT: PREVENTS OR INTERFERES SOME ACTIVE ACTIVITIES AND ADLS
PAIN_FUNCTIONAL_ASSESSMENT: 0-10

## 2021-08-16 ASSESSMENT — PAIN DESCRIPTION - DESCRIPTORS: DESCRIPTORS: ACHING

## 2021-08-16 ASSESSMENT — ENCOUNTER SYMPTOMS: BACK PAIN: 1

## 2021-08-16 NOTE — OP NOTE
Operative Note      Patient: Reinaldo Noble  YOB: 1955  MRN: [id number]    Date of Procedure:     Pre-Op Diagnosis:   Lumbar disc herniation  Left lumbar radiculitis    Post-Op Diagnosis: Same           * Surgery not found *    Assistant:   * Surgery not found *    Anesthesia: * No surgery found *    Estimated Blood Loss (mL): Minimal    Complications: None    Specimens:   * Cannot find log *    Implants:  * No surgical log found *      Drains: * No LDAs found *    Findings: n/a    Detailed Description of Procedure:     Patient Name: Reinaldo Noble   YOB: 1955  Room/Bed: Room/bed info not found  Medical Record Number: [id number]  Date: 8/16/2021       Preoperative Diagnosis:      Lumbar disc herniation  Left lumbar radiculitis      Postoperative diagnosis:   Lumbar disc herniation  Left lumbar radiculitis      Blood Loss: none    Procedure Performed:  Transforaminal lumbar epidural steroid injection at the levels of L4 AND L5  on the Left side under fluoroscopic guidance. Procedure: The Patient was seen in the preop area, chart was reviewed, informed consent was obtained. Patient was taken to procedure room and was placed in prone position. Vital signs were monitored through out the  Procedure. A time out was completed. The skin over the back was prepped and draped in sterile manner. The target point was marked in ipsilateral obliques just below the pedicle at the target levels. Skin and deep tissues were anesthetized with 1 % lidocaine. A 20-gauge, spinal needle was advanced  under fluoroscopy guidance in AP / Oblique and lateral views, such that the tip of the needle lies in the neuroforamina at about the 6 o'clock position under the pedicle of the target vertebra. This was confirmed with AP and lateral views of the fluoroscopy.      Then after negative aspiration contrast dye Omnipaque-180 was injected with live fluoroscopy in AP views that showed  spread of the contrast in the

## 2021-08-16 NOTE — H&P
The patient is a 73 y. o. Non- / non  female. No chief complaint on file. CC Back pain    HPI  60-year-old pleasant female with almost 6-month history of back pain  Symptoms started after a long drive to [place]  Her pain is predominantly located in the axial lower lumbar area as aching nagging stiffness that was progressively worsening  6 weeks back she developed severe flareup of her back pain without any particular injury  Now she has developed radiation of pain down left leg over hip thigh and anterior leg  Pain aggravated with lifting bending twisting turning  Nothing seems to alleviate the pain  Described the pain is sharp shooting throbbing burning sensation  No changes in bladder or bowel control     She been doing physical therapy have attended several sessions not noticed too much improvement  Have tried anti-inflammatory medication gabapentin tizanidine and tramadol  Pain is severe markedly disabling affecting quality of life  Had recent MRI lumbar spine     No previous lumbar spine injection history  No previous lumbar spine surgical history    Patient seen preop, chart reviewed, AAO x 3, in NAD, VSS,. No changes in medical history since last evaluation. Risk / Benefits explained to patient, patient agree to proceed with plan.   ASA 3  MP 2            Past Medical History:   Diagnosis Date    Asthma     DDD (degenerative disc disease), lumbar 7/14/2021    Hyperlipidemia     IBS (irritable bowel syndrome)     Left lumbar radiculitis 7/14/2021    Panic disorder       Past Surgical History:   Procedure Laterality Date    COLONOSCOPY      CYST INCISION AND DRAINAGE Right 11/1997    ENDOSCOPY, COLON, DIAGNOSTIC      NASAL POLYP SURGERY      OTHER SURGICAL HISTORY  08/02/2021    steroid injection    PAIN MANAGEMENT PROCEDURE Left 8/2/2021    LEFT L4 AND L5 EPIDURAL STEROID INJECTION performed by Kashif Salvador MD at [address] Marital status:      Spouse name: None    Number of children: None    Years of education: None    Highest education level: None   Occupational History    None   Tobacco Use    Smoking status: Never Smoker    Smokeless tobacco: Never Used    Tobacco comment: quite 40 years ago    Vaping Use    Vaping Use: Never used   Substance and Sexual Activity    Alcohol use: Not Currently     Comment: no longer able    Drug use: No    Sexual activity: None   Other Topics Concern    None   Social History Narrative    None     Social Determinants of Health     Financial Resource Strain: Low Risk     Difficulty of Paying Living Expenses: Not hard at all   Food Insecurity: No Food Insecurity    Worried About Running Out of Food in the Last Year: Never true    Shantel of Food in the Last Year: Never true   Transportation Needs:     Lack of Transportation (Medical):      Lack of Transportation (Non-Medical):    Physical Activity:     Days of Exercise per Week:     Minutes of Exercise per Session:    Stress:     Feeling of Stress :    Social Connections:     Frequency of Communication with Friends and Family:     Frequency of Social Gatherings with Friends and Family:     Attends Scientologist Services:     Active Member of Clubs or Organizations:     Attends Club or Organization Meetings:     Marital Status:    Intimate Partner Violence:     Fear of Current or Ex-Partner:     Emotionally Abused:     Physically Abused:     Sexually Abused:      Family History   Problem Relation Age of Onset    High Blood Pressure Mother     High Blood Pressure Father     Cancer Father         melanoma    Heart Disease Maternal Grandfather      Allergies   Allergen Reactions    Pcn [Penicillins]     Sulfa Antibiotics      Pcn [penicillins] and Sulfa antibiotics   Vitals:    08/16/21 1512   BP: 121/79   Pulse: 73   Resp: 12   Temp: 98.2 °F (36.8 °C)   SpO2: 96%     Current Outpatient Medications   Medication Sig Dispense Refill    clotrimazole-betamethasone (LOTRISONE) 1-0.05 % cream Apply topically 2 times daily. 45 g 0    beclomethasone (QVAR) 80 MCG/ACT inhaler INHALE 1 PUFF ORALLY TWICE DAILY 16 Inhaler 5    Multiple Vitamins-Minerals (THERAPEUTIC MULTIVITAMIN-MINERALS) tablet Take 1 tablet by mouth daily       Ascorbic Acid (VITAMIN C) 250 MG tablet Take 250 mg by mouth daily       Coenzyme Q10 (CO Q 10 PO) Take by mouth       Cholecalciferol (VITAMIN D3) 30 MCG/15ML LIQD Take by mouth       fluticasone (FLONASE) 50 MCG/ACT nasal spray USE 1 SPRAY NASALLY DAILY 1 Bottle 5    QVAR REDIHALER 80 MCG/ACT AERB inhaler  (Patient not taking: Reported on 8/6/2021)      Elastic Bandages & Supports (LUMBAR BACK BRACE/SUPPORT PAD) MISC 1 Units by Does not apply route daily Pneumatic offloading back brace 1 each 0    Cetirizine HCl (ZYRTEC ALLERGY PO) Take 1 tablet by mouth daily        No current facility-administered medications for this encounter. Review of Systems   Constitutional: Negative for fever. Cardiovascular: Positive for palpitations. Musculoskeletal: Positive for back pain. Psychiatric/Behavioral: Negative. All other systems reviewed and are negative. Objective:  General Appearance:  Well-appearing and in no acute distress. Vital signs: (most recent): Blood pressure 121/79, pulse 73, temperature 98.2 °F (36.8 °C), temperature source Temporal, resp. rate 12, height 5' 7\" (1.702 m), weight 157 lb (71.2 kg), last menstrual period 01/01/2001, SpO2 96 %, not currently breastfeeding. Vital signs are normal.  No fever. Output: Producing urine and producing stool. HEENT: Normal HEENT exam.    Lungs:  Normal effort and normal respiratory rate. Breath sounds clear to auscultation. She is not in respiratory distress. Heart: Normal rate. Extremities: Normal range of motion. There is no deformity. Neurological: Patient is alert and oriented to person, place and time.   Patient has normal coordination. Pupils:  Pupils are equal, round, and reactive to light. Pupils are equal.   Skin:  Warm and dry. No rash or cyanosis.       Assessment & Plan     Dx: Lumbar Disc herniation  Lumbar radiculits    Plan:  Lumnbar DIA    Risk / benefits discussed  informed consent signed      Electronically signed by Everardo Batista MD on 8/16/2021 at 3:29 PM

## 2021-08-17 ENCOUNTER — HOSPITAL ENCOUNTER (OUTPATIENT)
Dept: NON INVASIVE DIAGNOSTICS | Age: 66
Discharge: HOME | End: 2021-08-17
Payer: COMMERCIAL

## 2021-08-17 DIAGNOSIS — R00.0 TACHYCARDIA: ICD-10-CM

## 2021-08-17 DIAGNOSIS — R06.02 SOB (SHORTNESS OF BREATH): ICD-10-CM

## 2021-08-17 DIAGNOSIS — R00.2 PALPITATION: ICD-10-CM

## 2021-08-17 LAB
LV EF: 55 %
LVEF MODALITY: NORMAL

## 2021-08-17 PROCEDURE — 93306 TTE W/DOPPLER COMPLETE: CPT

## 2021-08-17 PROCEDURE — 93226 XTRNL ECG REC<48 HR SCAN A/R: CPT

## 2021-08-17 PROCEDURE — 93225 XTRNL ECG REC<48 HRS REC: CPT

## 2021-08-20 ENCOUNTER — TELEPHONE (OUTPATIENT)
Dept: INTERNAL MEDICINE CLINIC | Age: 66
End: 2021-08-20

## 2021-08-20 LAB
ACQUISITION DURATION: NORMAL S
AVERAGE HEART RATE: 82 BPM
HOOKUP DATE: NORMAL
HOOKUP TIME: NORMAL
MAX HEART RATE TIME/DATE: NORMAL
MAX HEART RATE: 125 BPM
MIN HEART RATE TIME/DATE: NORMAL
MIN HEART RATE: 63 BPM
NUMBER OF QRS COMPLEXES: NORMAL
NUMBER OF SUPRAVENTRICULAR COUPLETS: 0
NUMBER OF SUPRAVENTRICULAR ECTOPICS: 123
NUMBER OF SUPRAVENTRICULAR ISOLATED BEATS: 105
NUMBER OF VENTRICULAR BIGEMINAL CYCLES: 42
NUMBER OF VENTRICULAR COUPLETS: 51
NUMBER OF VENTRICULAR ECTOPICS: 4265

## 2021-08-23 ENCOUNTER — OFFICE VISIT (OUTPATIENT)
Dept: INTERNAL MEDICINE CLINIC | Age: 66
End: 2021-08-23
Payer: COMMERCIAL

## 2021-08-23 VITALS
BODY MASS INDEX: 24.71 KG/M2 | WEIGHT: 157.4 LBS | SYSTOLIC BLOOD PRESSURE: 108 MMHG | TEMPERATURE: 98.2 F | DIASTOLIC BLOOD PRESSURE: 68 MMHG | HEIGHT: 67 IN | HEART RATE: 77 BPM | RESPIRATION RATE: 20 BRPM | OXYGEN SATURATION: 98 %

## 2021-08-23 DIAGNOSIS — M54.9 INTRACTABLE BACK PAIN: ICD-10-CM

## 2021-08-23 DIAGNOSIS — F41.1 GENERALIZED ANXIETY DISORDER: ICD-10-CM

## 2021-08-23 DIAGNOSIS — R00.2 PALPITATIONS: ICD-10-CM

## 2021-08-23 DIAGNOSIS — Z01.818 PREOPERATIVE CLEARANCE: Primary | ICD-10-CM

## 2021-08-23 DIAGNOSIS — Z83.2 FAMILY HISTORY OF CLOTTING DISORDER: ICD-10-CM

## 2021-08-23 PROCEDURE — 99214 OFFICE O/P EST MOD 30 MIN: CPT | Performed by: INTERNAL MEDICINE

## 2021-08-23 RX ORDER — BUSPIRONE HYDROCHLORIDE 5 MG/1
5 TABLET ORAL 3 TIMES DAILY
Qty: 90 TABLET | Refills: 0 | Status: SHIPPED | OUTPATIENT
Start: 2021-08-23 | End: 2021-09-22

## 2021-08-23 RX ORDER — ACETAMINOPHEN 500 MG
500 TABLET ORAL EVERY 6 HOURS PRN
COMMUNITY

## 2021-08-23 NOTE — PROGRESS NOTES
Vicente Bishop is a 73 y.o. female who presents for   Chief Complaint   Patient presents with   [address]     would like to discuss heart rate and possible anxiety medication;     Health Maintenance     hep c, hiv, shingles, dexa, cervical     and follow up of chronic medical problems. Patient Active Problem List   Diagnosis    IBS (irritable bowel syndrome)    Panic disorder    Chest pain    Lumbar disc herniation    Left lumbar radiculitis    DDD (degenerative disc disease), lumbar     HPI  Here for follow-up for back pain and patient also wants to discuss about her palpitations happening all of a sudden when she is sitting and cardiac work-up was negative and also wants to discuss about her family history of blood clots and her brother  after having surgical procedure PE and her mother also had a history of pulmonary embolism and neurosurgery wanted to discuss with her PCP and patient's pain is stable    Current Outpatient Medications   Medication Sig Dispense Refill    acetaminophen (TYLENOL) 500 MG tablet Take 500 mg by mouth every 6 hours as needed for Pain      MAGNESIUM PO Take by mouth      busPIRone (BUSPAR) 5 MG tablet Take 1 tablet by mouth 3 times daily 90 tablet 0    clotrimazole-betamethasone (LOTRISONE) 1-0.05 % cream Apply topically 2 times daily.  45 g 0    Elastic Bandages & Supports (LUMBAR BACK BRACE/SUPPORT PAD) MISC 1 Units by Does not apply route daily Pneumatic offloading back brace 1 each 0    beclomethasone (QVAR) 80 MCG/ACT inhaler INHALE 1 PUFF ORALLY TWICE DAILY 16 Inhaler 5    Multiple Vitamins-Minerals (THERAPEUTIC MULTIVITAMIN-MINERALS) tablet Take 1 tablet by mouth daily       Ascorbic Acid (VITAMIN C) 250 MG tablet Take 250 mg by mouth daily       Coenzyme Q10 (CO Q 10 PO) Take by mouth       Cholecalciferol (VITAMIN D3) 30 MCG/15ML LIQD Take by mouth       fluticasone (FLONASE) 50 MCG/ACT nasal spray USE 1 SPRAY NASALLY DAILY 1 Bottle 5    QVAR mood, sleep disturbances, nervous or anxious, or decreased concentration   All other review of systems was negative    Objective  Physical Examination:    Nursing note reviewed    /68 (Site: Right Upper Arm, Position: Sitting, Cuff Size: Medium Adult)   Pulse 77   Temp 98.2 °F (36.8 °C) (Temporal)   Resp 20   Ht 5' 7.01\" (1.702 m)   Wt 157 lb 6.4 oz (71.4 kg)   LMP 01/01/2001 (LMP Unknown)   SpO2 98%   Breastfeeding No   BMI 24.65 kg/m²   BP Readings from Last 3 Encounters:   08/23/21 108/68   08/16/21 115/76   08/16/21 (!) 109/58         Constitutional:  Che Martinez is oriented to place, person and time ,appears well-developed and well-nourished  HEENT:  Atraumatic and normocephalic, external ears normal bilaterally, nose normal no oropharyngeal exudate and is clear and moist  Eyes:  EOCM normal; conjunctivae normal; PERRLA bilaterally  Neck:  Normal range of motion, neck supple, no JVD and no thyromegaly  Cardiovascular:  RRR, normal heart sounds and intact distal pulses  Pulmonary:  effort normal and breath sounds normal bilaterally,no wheezes or rales, no respiratory distress  Abdominal:  Soft, non-tender; normal bowel sounds, no masses  Musculoskeletal:  Normal range of motion and no edema or tenderness bilaterally  No lymphadenopathy  Neurological:  alert, oriented, and normal reflexes bilaterally  Skin: warm and dry  Psychiatric:  normal mood and effect; behavior normal.    Labs:   No results found for: LABA1C  Lab Results   Component Value Date    CHOL 189 07/15/2021     Lab Results   Component Value Date    HDL 39 (L) 07/15/2021     Lab Results   Component Value Date    LDLCALC 128 11/16/2020     Lab Results   Component Value Date    TRIG 96 07/15/2021     No components found for: CHOLHDL  Lab Results   Component Value Date    WBC 7.1 07/27/2021    HGB 13.0 07/27/2021    HCT 41.3 07/27/2021    MCV 96.3 07/27/2021     (H) 07/27/2021     Lab Results   Component Value Date    INR 0.9 01/03/2018    PROTIME 9.7 01/03/2018     Lab Results   Component Value Date    GLUCOSE 116 (H) 07/27/2021    CREATININE 0.52 07/27/2021    BUN 8 07/27/2021     07/27/2021    K 3.7 07/27/2021    CL 98 07/27/2021    CO2 24 07/27/2021     Lab Results   Component Value Date    ALT 57 (H) 07/23/2021    AST 22 07/23/2021    ALKPHOS 196 (H) 07/15/2021    BILITOT 0.37 07/15/2021     Lab Results   Component Value Date    LABALBU 3.7 07/15/2021     Lab Results   Component Value Date    TSH 2.01 07/27/2021     Assessment:   Diagnosis Orders   1. Preoperative clearance  Protime-INR    Antithrombin III Antigen    Protein S Antigen, Total    Protein C Antigen, Total    Factor 5 Leiden   2. Family history of clotting disorder  Protime-INR    Antithrombin III Antigen    Protein S Antigen, Total    Protein C Antigen, Total    Factor 5 Leiden   3. Palpitations  Protime-INR    Antithrombin III Antigen    Protein S Antigen, Total    Protein C Antigen, Total    Factor 5 Leiden   4. Intractable back pain     5.  Generalized anxiety disorder           Plan:  Patient's heart rate on auscultation was 64 and explained to the patient that at this time she does not need any beta-blockers but if she feels that her heart rate is increasing can call me back and will start on low-dose beta-blocker before the procedure and cardiology note reviewed who opined the same  Patient advised to take medications for anxiety and long-term or short-term but patient not interested and wants to take it only as needed and I told her that Xanax would cause habit-forming and so we will try BuSpar 5 mg 3 times a day as needed and patient verbalized understanding  Patient is scheduled for surgery on September 3 and having preop done on August 25 and also will be seeing the surgeon on August 30 to discuss issues of history of coagulation problems in the family and requesting me to order lab work and I did order coagulation profile and pro time and INR as requested to evaluate for any abnormality  Review as scheduled           1. Che Martinez received counseling on the following healthy behaviors: nutrition and exercise    2. Prior labs and health maintenance reviewed. 3.  Discussed use, benefit, and side effects of prescribed medications. Barriers to medication compliance addressed. All her questions were answered. Pt voiced understanding. Che Martinez will continue current medications, diet and exercise. Orders Placed This Encounter   Medications    busPIRone (BUSPAR) 5 MG tablet     Sig: Take 1 tablet by mouth 3 times daily     Dispense:  90 tablet     Refill:  0          Completed Refills               Requested Prescriptions     Signed Prescriptions Disp Refills    busPIRone (BUSPAR) 5 MG tablet 90 tablet 0     Sig: Take 1 tablet by mouth 3 times daily     4. Patient given educational materials - see patient instructions    5. Was a self-tracking handout given in paper form or via [employer]? NO    Orders Placed This Encounter   Procedures    Protime-INR     Standing Status:   Future     Standing Expiration Date:   8/23/2022     Order Specific Question:   Daily Coumadin Dose? Answer:   none    Antithrombin III Antigen     Standing Status:   Future     Standing Expiration Date:   8/23/2022    Protein S Antigen, Total     Standing Status:   Future     Standing Expiration Date:   8/23/2022    Protein C Antigen, Total     Standing Status:   Future     Standing Expiration Date:   8/23/2022    Factor 5 Leiden     Standing Status:   Future     Standing Expiration Date:   8/23/2022     Order Specific Question:   Factor V Specimen     Answer:   Blood     No follow-ups on file. Patient voiced understanding and agreed to treatment plan.      Electronically signed by Yessica Richards MD on 8/23/2021 at 9:56 AM    This note is created with a voice recognition program and while intend to generate a document that accurately reflects the content of the visit, no guarantee can be provided that every mistake has been identified and corrected by editing.

## 2021-10-22 LAB
BILIRUBIN, URINE: NORMAL
BLOOD, URINE: NORMAL
CLARITY: NORMAL
COLOR: NORMAL
GLUCOSE URINE: NORMAL
KETONES, URINE: NORMAL
LEUKOCYTE ESTERASE, URINE: NORMAL
NITRITE, URINE: NORMAL
PH UA: NORMAL
PROTEIN UA: NORMAL
SPECIFIC GRAVITY, URINE: NORMAL
UROBILINOGEN, URINE: NORMAL

## 2021-10-26 DIAGNOSIS — R31.9 HEMATURIA, UNSPECIFIED TYPE: ICD-10-CM

## 2021-11-09 ENCOUNTER — OFFICE VISIT (OUTPATIENT)
Dept: INTERNAL MEDICINE CLINIC | Age: 66
End: 2021-11-09
Payer: COMMERCIAL

## 2021-11-09 VITALS
SYSTOLIC BLOOD PRESSURE: 92 MMHG | TEMPERATURE: 97.9 F | WEIGHT: 155 LBS | DIASTOLIC BLOOD PRESSURE: 62 MMHG | HEART RATE: 81 BPM | RESPIRATION RATE: 20 BRPM | BODY MASS INDEX: 24.33 KG/M2 | OXYGEN SATURATION: 99 % | HEIGHT: 67 IN

## 2021-11-09 DIAGNOSIS — M51.36 DDD (DEGENERATIVE DISC DISEASE), LUMBAR: ICD-10-CM

## 2021-11-09 DIAGNOSIS — Z98.890 S/P LUMBAR MICRODISCECTOMY: Primary | ICD-10-CM

## 2021-11-09 DIAGNOSIS — F41.1 GENERALIZED ANXIETY DISORDER: ICD-10-CM

## 2021-11-09 PROCEDURE — 99214 OFFICE O/P EST MOD 30 MIN: CPT | Performed by: INTERNAL MEDICINE

## 2021-11-09 RX ORDER — BUSPIRONE HYDROCHLORIDE 5 MG/1
5 TABLET ORAL 3 TIMES DAILY PRN
COMMUNITY
Start: 2021-08-23

## 2021-11-09 RX ORDER — LIDOCAINE 50 MG/G
1 PATCH TOPICAL DAILY
COMMUNITY
Start: 2021-05-25

## 2021-11-09 ASSESSMENT — PATIENT HEALTH QUESTIONNAIRE - PHQ9
SUM OF ALL RESPONSES TO PHQ QUESTIONS 1-9: 0
1. LITTLE INTEREST OR PLEASURE IN DOING THINGS: 0
SUM OF ALL RESPONSES TO PHQ9 QUESTIONS 1 & 2: 0
2. FEELING DOWN, DEPRESSED OR HOPELESS: 0

## 2021-11-09 NOTE — PROGRESS NOTES
Becky Blanchard is a 73 y.o. female who presents for   Chief Complaint   Patient presents with    3 Month Follow-Up     HAD BACK SURGERY IN 9/21 NOTES ARE SCANNED IN     Health Maintenance     HEP C, COVID, HIV, DEXA, CERVICAL    and follow up of chronic medical problems. Patient Active Problem List   Diagnosis    IBS (irritable bowel syndrome)    Panic disorder    Chest pain    Lumbar disc herniation    Left lumbar radiculitis    DDD (degenerative disc disease), lumbar     HPI  Here for follow-up after back surgery doing well denies any complaints    Current Outpatient Medications   Medication Sig Dispense Refill    lidocaine (LIDODERM) 5 % Place 1 patch onto the skin daily      busPIRone (BUSPAR) 5 MG tablet Take 5 mg by mouth 3 times daily as needed      acetaminophen (TYLENOL) 500 MG tablet Take 500 mg by mouth every 6 hours as needed for Pain      MAGNESIUM PO Take by mouth      clotrimazole-betamethasone (LOTRISONE) 1-0.05 % cream Apply topically 2 times daily. 45 g 0    beclomethasone (QVAR) 80 MCG/ACT inhaler INHALE 1 PUFF ORALLY TWICE DAILY 16 Inhaler 5    Multiple Vitamins-Minerals (THERAPEUTIC MULTIVITAMIN-MINERALS) tablet Take 1 tablet by mouth daily       Coenzyme Q10 (CO Q 10 PO) Take by mouth       Cholecalciferol (VITAMIN D3) 30 MCG/15ML LIQD Take by mouth       fluticasone (FLONASE) 50 MCG/ACT nasal spray USE 1 SPRAY NASALLY DAILY 1 Bottle 5    Cetirizine HCl (ZYRTEC ALLERGY PO) Take 1 tablet by mouth daily       Elastic Bandages & Supports (LUMBAR BACK BRACE/SUPPORT PAD) MISC 1 Units by Does not apply route daily Pneumatic offloading back brace (Patient not taking: Reported on 11/9/2021) 1 each 0    Ascorbic Acid (VITAMIN C) 250 MG tablet Take 250 mg by mouth daily  (Patient not taking: Reported on 11/9/2021)       No current facility-administered medications for this visit.        Allergies   Allergen Reactions    Pcn [Penicillins]     Sulfa Antibiotics        Past Medical kg)   LMP 01/01/2001 (LMP Unknown)   SpO2 99%   Breastfeeding No   BMI 24.27 kg/m²   BP Readings from Last 3 Encounters:   11/09/21 92/62   08/23/21 108/68   08/16/21 115/76         Constitutional:  Ivin Simmonds is oriented to place, person and time ,appears well-developed and well-nourished  HEENT:  Atraumatic and normocephalic, external ears normal bilaterally, nose normal no oropharyngeal exudate and is clear and moist  Eyes:  EOCM normal; conjunctivae normal; PERRLA bilaterally  Neck:  Normal range of motion, neck supple, no JVD and no thyromegaly  Cardiovascular:  RRR, normal heart sounds and intact distal pulses  Pulmonary:  effort normal and breath sounds normal bilaterally,no wheezes or rales, no respiratory distress  Abdominal:  Soft, non-tender; normal bowel sounds, no masses  Musculoskeletal:  Normal range of motion and no edema or tenderness bilaterally  No lymphadenopathy  Neurological:  alert, oriented, and normal reflexes bilaterally  Skin: warm and dry  Psychiatric:  normal mood and effect; behavior normal.    Labs:   No results found for: LABA1C  Lab Results   Component Value Date    CHOL 189 07/15/2021     Lab Results   Component Value Date    HDL 39 (L) 07/15/2021     Lab Results   Component Value Date    LDLCALC 128 11/16/2020     Lab Results   Component Value Date    TRIG 96 07/15/2021     No components found for: [place], [place]  Lab Results   Component Value Date    WBC 7.1 07/27/2021    HGB 13.0 07/27/2021    HCT 41.3 07/27/2021    MCV 96.3 07/27/2021     (H) 07/27/2021     Lab Results   Component Value Date    INR 0.9 01/03/2018    PROTIME 9.7 01/03/2018     Lab Results   Component Value Date    GLUCOSE 116 (H) 07/27/2021    CREATININE 0.52 07/27/2021    BUN 8 07/27/2021     07/27/2021    K 3.7 07/27/2021    CL 98 07/27/2021    CO2 24 07/27/2021     Lab Results   Component Value Date    ALT 57 (H) 07/23/2021    AST 22 07/23/2021    ALKPHOS 196 (H) 07/15/2021    BILITOT 0.37 07/15/2021 Lab Results   Component Value Date    LABALBU 3.7 07/15/2021     Lab Results   Component Value Date    TSH 2.01 07/27/2021     Assessment:  1. S/P lumbar microdiscectomy    2. Generalized anxiety disorder    3. DDD (degenerative disc disease), lumbar        Plan:  Patient's visit to the neurosurgeon for back surgery and reports reviewed and patient doing physical therapy and doing better except having muscle cramping and advised to continue exercise and patient to follow with neurosurgery as needed  Patient's anxiety is improved and palpitations resolved and taking BuSpar only as needed  Advised to continue exercise and activity as tolerated  Review in 6 months           1. Jagjit Caceres received counseling on the following healthy behaviors: nutrition and exercise    2. Prior labs and health maintenance reviewed. 3.  Discussed use, benefit, and side effects of prescribed medications. Barriers to medication compliance addressed. All her questions were answered. Pt voiced understanding. Jagjit Caceres will continue current medications, diet and exercise. No orders of the defined types were placed in this encounter. Completed Refills               Requested Prescriptions      No prescriptions requested or ordered in this encounter     4. Patient given educational materials - see patient instructions    5. Was a self-tracking handout given in paper form or via [employer]? NO    No orders of the defined types were placed in this encounter. Return in about 6 months (around 5/9/2022). Patient voiced understanding and agreed to treatment plan. Electronically signed by Noelle Dwyer MD on 11/9/2021 at 10:26 AM    This note is created with a voice recognition program and while intend to generate a document that accurately reflects the content of the visit, no guarantee can be provided that every mistake has been identified and corrected by editing.

## 2021-12-30 ENCOUNTER — TELEPHONE (OUTPATIENT)
Dept: INTERNAL MEDICINE CLINIC | Age: 66
End: 2021-12-30

## 2021-12-30 NOTE — TELEPHONE ENCOUNTER
Pt called was exposed to covid Friday and she is now having congestion,cough,sneezing,headache. She is scheduled for covid test on Sunday at [facility] but wanted to know if there was anything else she could do or that you could do.  Please advise

## 2022-03-24 ENCOUNTER — TELEPHONE (OUTPATIENT)
Dept: INTERNAL MEDICINE CLINIC | Age: 67
End: 2022-03-24

## 2022-03-24 DIAGNOSIS — J32.9 SINUSITIS, UNSPECIFIED CHRONICITY, UNSPECIFIED LOCATION: Primary | ICD-10-CM

## 2022-03-24 RX ORDER — AZITHROMYCIN 250 MG/1
250 TABLET, FILM COATED ORAL SEE ADMIN INSTRUCTIONS
Qty: 6 TABLET | Refills: 0 | Status: SHIPPED | OUTPATIENT
Start: 2022-03-24 | End: 2022-03-29

## 2022-03-24 NOTE — TELEPHONE ENCOUNTER
Patient states she has had a cold for about 2 weeks, but now thinks it has turned into a sinus infection. Has pressure in her head and around her eye    Is asking for something to be called into [facility]?

## 2022-03-25 ENCOUNTER — TELEPHONE (OUTPATIENT)
Dept: INTERNAL MEDICINE CLINIC | Age: 67
End: 2022-03-25

## 2022-03-25 NOTE — TELEPHONE ENCOUNTER
Patient calling to ask what she can take for ear pain states she got antibiotic 2 days ago and now has started with ear pain

## 2022-05-09 ENCOUNTER — OFFICE VISIT (OUTPATIENT)
Dept: INTERNAL MEDICINE CLINIC | Age: 67
End: 2022-05-09
Payer: MEDICARE

## 2022-05-09 ENCOUNTER — TELEPHONE (OUTPATIENT)
Dept: INTERNAL MEDICINE CLINIC | Age: 67
End: 2022-05-09

## 2022-05-09 VITALS
SYSTOLIC BLOOD PRESSURE: 114 MMHG | TEMPERATURE: 98.1 F | HEART RATE: 77 BPM | DIASTOLIC BLOOD PRESSURE: 74 MMHG | HEIGHT: 67 IN | WEIGHT: 161.8 LBS | BODY MASS INDEX: 25.39 KG/M2 | RESPIRATION RATE: 16 BRPM | OXYGEN SATURATION: 98 %

## 2022-05-09 DIAGNOSIS — E78.00 HIGH CHOLESTEROL: Primary | ICD-10-CM

## 2022-05-09 DIAGNOSIS — E55.9 VITAMIN D DEFICIENCY: ICD-10-CM

## 2022-05-09 DIAGNOSIS — Z98.890 S/P LUMBAR MICRODISCECTOMY: ICD-10-CM

## 2022-05-09 PROCEDURE — 99214 OFFICE O/P EST MOD 30 MIN: CPT | Performed by: INTERNAL MEDICINE

## 2022-05-09 ASSESSMENT — PATIENT HEALTH QUESTIONNAIRE - PHQ9
SUM OF ALL RESPONSES TO PHQ QUESTIONS 1-9: 0
SUM OF ALL RESPONSES TO PHQ9 QUESTIONS 1 & 2: 0
SUM OF ALL RESPONSES TO PHQ QUESTIONS 1-9: 0
2. FEELING DOWN, DEPRESSED OR HOPELESS: 0
SUM OF ALL RESPONSES TO PHQ QUESTIONS 1-9: 0
1. LITTLE INTEREST OR PLEASURE IN DOING THINGS: 0
SUM OF ALL RESPONSES TO PHQ QUESTIONS 1-9: 0

## 2022-05-09 NOTE — PROGRESS NOTES
Jovanny Rodriguez is a 73 y.o. female who presents for   Chief Complaint   Patient presents with    Follow-up     pt had back surg sept 2021, pt states shes having back aches.  Other     pt would like to discuss second booster.  Health Maintenance     hiv, hep c, dex, tdap, cerv.     and follow up of chronic medical problems. Patient Active Problem List   Diagnosis    IBS (irritable bowel syndrome)    Panic disorder    Chest pain    Lumbar disc herniation    Left lumbar radiculitis    DDD (degenerative disc disease), lumbar     HPI  Here for follow-up on back pain and anxiety denies any new complaints and doing better    Current Outpatient Medications   Medication Sig Dispense Refill    Red Yeast Rice Extract (RED YEAST RICE PO) Take by mouth daily      ELDERBERRY PO Take by mouth daily      lidocaine (LIDODERM) 5 % Place 1 patch onto the skin daily      busPIRone (BUSPAR) 5 MG tablet Take 5 mg by mouth 3 times daily as needed      acetaminophen (TYLENOL) 500 MG tablet Take 500 mg by mouth every 6 hours as needed for Pain      MAGNESIUM PO Take by mouth      clotrimazole-betamethasone (LOTRISONE) 1-0.05 % cream Apply topically 2 times daily. 45 g 0    Elastic Bandages & Supports (LUMBAR BACK BRACE/SUPPORT PAD) MISC 1 Units by Does not apply route daily Pneumatic offloading back brace 1 each 0    beclomethasone (QVAR) 80 MCG/ACT inhaler INHALE 1 PUFF ORALLY TWICE DAILY 16 Inhaler 5    Multiple Vitamins-Minerals (THERAPEUTIC MULTIVITAMIN-MINERALS) tablet Take 1 tablet by mouth daily       Ascorbic Acid (VITAMIN C) 250 MG tablet Take 250 mg by mouth daily       Coenzyme Q10 (CO Q 10 PO) Take by mouth       Cholecalciferol (VITAMIN D3) 30 MCG/15ML LIQD Take by mouth       fluticasone (FLONASE) 50 MCG/ACT nasal spray USE 1 SPRAY NASALLY DAILY 1 Bottle 5    Cetirizine HCl (ZYRTEC ALLERGY PO) Take 1 tablet by mouth daily        No current facility-administered medications for this visit. Allergies   Allergen Reactions    Pcn [Penicillins]     Sulfa Antibiotics        Past Medical History:   Diagnosis Date    Asthma     DDD (degenerative disc disease), lumbar 7/14/2021    Hyperlipidemia     IBS (irritable bowel syndrome)     Left lumbar radiculitis 7/14/2021    Panic disorder        Past Surgical History:   Procedure Laterality Date    COLONOSCOPY      CYST INCISION AND DRAINAGE Right 11/1997    ENDOSCOPY, COLON, DIAGNOSTIC      NASAL POLYP SURGERY      OTHER SURGICAL HISTORY  08/02/2021    steroid injection    PAIN MANAGEMENT PROCEDURE Left 8/2/2021    LEFT L4 AND L5 EPIDURAL STEROID INJECTION performed by Christen Rivera MD at [address] [place] [address] History   Problem Relation Age of Onset    High Blood Pressure Mother     High Blood Pressure Father     Cancer Father         melanoma    Heart Disease Maternal Grandfather      ROS   Constitutional:  Negative for fatigue, loss of appetite and unexpected weight change   HEENT            : Negative for neck stiffness and pain, no congestion or sinus pressure   Eyes                : No visual disturbance or pain   Cardiovascular: No chest pain or palpitations or leg swelling   Respiratory      : Negative for cough, shortness of breath or wheezing   Gastrointestinal: Negative for abdominal pain, constipation or diarrhea and bloating No nausea or vomiting   Genitourinary:     No urgency or frequency, no burning or hematuria   Musculoskeletal: No arthralgias, back pain or myalgias   Skin                  : Negative for rash or erythema   Neurological    : Negative for dizziness, weakness, tremors ,light headedness or syncope   Psychiatric       : Negative for dysphoric mood, sleep disturbances, nervous or anxious, or decreased concentration   All other review of systems was negative    Objective  Physical Examination:    Nursing note reviewed    /74 (Site: Left Upper Arm, Position: Sitting, Cuff Size: Medium Adult) Pulse 77   Temp 98.1 °F (36.7 °C) (Temporal)   Resp 16   Ht 5' 7.01\" (1.702 m)   Wt 161 lb 12.8 oz (73.4 kg)   LMP 01/01/2001 (LMP Unknown)   SpO2 98%   BMI 25.34 kg/m²   BP Readings from Last 3 Encounters:   05/09/22 114/74   11/09/21 92/62   08/23/21 108/68         Constitutional:  Sharath Hearing is oriented to place, person and time ,appears well-developed and well-nourished  HEENT:  Atraumatic and normocephalic, external ears normal bilaterally, nose normal no oropharyngeal exudate and is clear and moist  Eyes:  EOCM normal; conjunctivae normal; PERRLA bilaterally  Neck:  Normal range of motion, neck supple, no JVD and no thyromegaly  Cardiovascular:  RRR, normal heart sounds and intact distal pulses  Pulmonary:  effort normal and breath sounds normal bilaterally,no wheezes or rales, no respiratory distress  Abdominal:  Soft, non-tender; normal bowel sounds, no masses  Musculoskeletal:  Normal range of motion and no edema or tenderness bilaterally  No lymphadenopathy  Neurological:  alert, oriented, and normal reflexes bilaterally  Skin: warm and dry  Psychiatric:  normal mood and effect; behavior normal.    Labs:   No results found for: LABA1C  Lab Results   Component Value Date    CHOL 189 07/15/2021     Lab Results   Component Value Date    HDL 39 (L) 07/15/2021     Lab Results   Component Value Date    LDLCALC 128 11/16/2020     Lab Results   Component Value Date    TRIG 96 07/15/2021     No components found for: [place], [place]  Lab Results   Component Value Date    WBC 7.1 07/27/2021    HGB 13.0 07/27/2021    HCT 41.3 07/27/2021    MCV 96.3 07/27/2021     (H) 07/27/2021     Lab Results   Component Value Date    INR 0.9 01/03/2018    PROTIME 9.7 01/03/2018     Lab Results   Component Value Date    GLUCOSE 116 (H) 07/27/2021    CREATININE 0.52 07/27/2021    BUN 8 07/27/2021     07/27/2021    K 3.7 07/27/2021    CL 98 07/27/2021    CO2 24 07/27/2021     Lab Results   Component Value Date    ALT 57 (H) 07/23/2021    AST 22 07/23/2021    ALKPHOS 196 (H) 07/15/2021    BILITOT 0.37 07/15/2021     Lab Results   Component Value Date    LABALBU 3.7 07/15/2021     Lab Results   Component Value Date    TSH 2.01 07/27/2021     Assessment:  1. High cholesterol    2. Vitamin D deficiency    3. S/P lumbar microdiscectomy        Plan:  Patient's last LDL was 131 and advised patient repeat lab work continue lab order given to the patient  Patient is on vitamin D supplements and will reorder to evaluate the levels along with TSH CBC and CMP  Patient's back pain is stable and having some minor aches and pains and doing the exercises and doing well  Patient states that she had a colonoscopy in the last 4 to 5 years and will get information  Patient wants to wait on the mammogram the last 1 was 2/26/2021 and will do it at the end of the year  Advised patient to get the booster vaccine as her last vaccine was in October and she had a COVID infection in January  Review in 6 months           1. Shanita Mclean received counseling on the following healthy behaviors: nutrition and exercise    2. Prior labs and health maintenance reviewed. 3.  Discussed use, benefit, and side effects of prescribed medications. Barriers to medication compliance addressed. All her questions were answered. Pt voiced understanding. Shanita Mclean will continue current medications, diet and exercise. No orders of the defined types were placed in this encounter. Completed Refills               Requested Prescriptions      No prescriptions requested or ordered in this encounter     4. Patient given educational materials - see patient instructions    5. Was a self-tracking handout given in paper form or via [employer]?   NO    Orders Placed This Encounter   Procedures    Comprehensive Metabolic Panel     Standing Status:   Future     Standing Expiration Date:   5/9/2023    CBC     Standing Status:   Future     Standing Expiration Date:   5/9/2023   Lazaro Duque Magnesium     Standing Status:   Future     Standing Expiration Date:   5/9/2023    Vitamin B12     Standing Status:   Future     Standing Expiration Date:   5/9/2023    TSH     Standing Status:   Future     Standing Expiration Date:   5/9/2023    Lipid Panel     Standing Status:   Future     Standing Expiration Date:   5/9/2023     Order Specific Question:   Is Patient Fasting?/# of Hours     Answer:   12    Vitamin D 25 Hydroxy     Standing Status:   Future     Standing Expiration Date:   5/9/2023     Return in about 6 months (around 11/9/2022). Patient voiced understanding and agreed to treatment plan. Electronically signed by Khushi Harris MD on 5/9/2022 at 11:03 AM    This note is created with a voice recognition program and while intend to generate a document that accurately reflects the content of the visit, no guarantee can be provided that every mistake has been identified and corrected by editing.

## 2022-05-09 NOTE — TELEPHONE ENCOUNTER
INTRODUCTION -- The following instructions will guide you in the proper collection of a 24-hour urine specimen. In some instances, you will be asked to collect two or three consecutive 24-hour urine samples. INSTRUCTIONS  ? You should collect every drop of urine during each 24-hour period. It does not matter how much or little urine is passed each time, as long as every drop is collected. ? Begin the urine collection in the morning after you wake up, after you have emptied your bladder for the first time. ? Urinate (empty the bladder) for the first time and flush it down the toilet. Note the exact time (eg, 6:15 AM). You will begin the urine collection at this time. ?Collect every drop of urine during the day and night in an empty collection bottle. Store the bottle at room temperature or in the refrigerator. ?If you need to have a bowel movement, any urine passed with the bowel movement should be collected. Try not to include feces with the urine collection. If feces does get mixed in, do not try to remove the feces from the urine collection bottle. ? Finish by collecting the first urine passed the next morning, adding it to the collection bottle. This should be within ten minutes before or after the time of the first morning void on the first day (which was flushed). In this example, you would try to void between 6:05 and 6:25 on the second day. If you need to urinate one hour before the final collection time, drink a full glass of water so that you can void again at the appropriate time. If you have to urinate 20 minutes before, try to hold the urine until the proper time. Please note the exact time of the final collection, even if it is not the same time as when collection began on day 1. STORAGE -- The bottle(s) may be kept at room temperature for a day or two, but should be kept cool or refrigerated for longer periods of time.   WHERE TO GET MORE INFORMATION -- Your healthcare provider is the best source Patient calling back to let you know that she had her colonoscopy with Dr Buster Israel on 5/13/19 the will send report of information for questions and concerns related to your medical problem.

## 2022-10-12 NOTE — TELEPHONE ENCOUNTER
Misael Walsh is calling to request a refill on the following medication(s):    Medication Request:  Requested Prescriptions     Pending Prescriptions Disp Refills    beclomethasone (QVAR REDIHALER) 80 MCG/ACT AERB inhaler [Pharmacy Med Name: Vani Islas 80 MCG] 1 each 2     Sig: INHALE ONE PUFF BY MOUTH TWICE A DAY       Last Visit Date (If Applicable):  6/8/8128    Next Visit Date:    11/7/2022

## 2022-10-20 ENCOUNTER — HOSPITAL ENCOUNTER (OUTPATIENT)
Dept: MRI IMAGING | Facility: CLINIC | Age: 67
Discharge: HOME | End: 2022-10-22
Payer: MEDICARE

## 2022-10-20 ENCOUNTER — HOSPITAL ENCOUNTER (OUTPATIENT)
Age: 67
Setting detail: SPECIMEN
Discharge: HOME | End: 2022-10-20

## 2022-10-20 DIAGNOSIS — E78.00 HIGH CHOLESTEROL: ICD-10-CM

## 2022-10-20 DIAGNOSIS — Z98.890 HISTORY OF LUMBAR DISCECTOMY: ICD-10-CM

## 2022-10-20 DIAGNOSIS — M47.816 LUMBAR SPONDYLOSIS: ICD-10-CM

## 2022-10-20 DIAGNOSIS — E55.9 VITAMIN D DEFICIENCY: ICD-10-CM

## 2022-10-20 LAB
ALBUMIN SERPL-MCNC: 3.7 G/DL (ref 3.5–5.2)
ALBUMIN/GLOBULIN RATIO: 1.2 (ref 1–2.5)
ALP BLD-CCNC: 69 U/L (ref 35–104)
ALT SERPL-CCNC: 16 U/L (ref 5–33)
ANION GAP SERPL CALCULATED.3IONS-SCNC: 11 MMOL/L (ref 9–17)
AST SERPL-CCNC: 15 U/L
BILIRUB SERPL-MCNC: 0.3 MG/DL (ref 0.3–1.2)
BUN BLDV-MCNC: 11 MG/DL (ref 8–23)
CALCIUM SERPL-MCNC: 8.9 MG/DL (ref 8.6–10.4)
CHLORIDE BLD-SCNC: 102 MMOL/L (ref 98–107)
CHOLESTEROL/HDL RATIO: 3.9
CHOLESTEROL: 227 MG/DL
CO2: 26 MMOL/L (ref 20–31)
CREAT SERPL-MCNC: 0.59 MG/DL (ref 0.5–0.9)
GFR SERPL CREATININE-BSD FRML MDRD: >60 ML/MIN/1.73M2
GLUCOSE BLD-MCNC: 102 MG/DL (ref 70–99)
HCT VFR BLD CALC: 40.6 % (ref 36.3–47.1)
HDLC SERPL-MCNC: 58 MG/DL
HEMOGLOBIN: 13.1 G/DL (ref 11.9–15.1)
LDL CHOLESTEROL: 150 MG/DL (ref 0–130)
MAGNESIUM: 2.1 MG/DL (ref 1.6–2.6)
MCH RBC QN AUTO: 31.3 PG (ref 25.2–33.5)
MCHC RBC AUTO-ENTMCNC: 32.3 G/DL (ref 28.4–34.8)
MCV RBC AUTO: 96.9 FL (ref 82.6–102.9)
NRBC AUTOMATED: 0 PER 100 WBC
PDW BLD-RTO: 13.1 % (ref 11.8–14.4)
PLATELET # BLD: 277 K/UL (ref 138–453)
PMV BLD AUTO: 11 FL (ref 8.1–13.5)
POC CREATININE: 0.7 MG/DL (ref 0.6–1.4)
POTASSIUM SERPL-SCNC: 5.1 MMOL/L (ref 3.7–5.3)
RBC # BLD: 4.19 M/UL (ref 3.95–5.11)
SODIUM BLD-SCNC: 139 MMOL/L (ref 135–144)
TOTAL PROTEIN: 6.8 G/DL (ref 6.4–8.3)
TRIGL SERPL-MCNC: 93 MG/DL
TSH SERPL DL<=0.05 MIU/L-ACNC: 3 UIU/ML (ref 0.3–5)
VITAMIN B-12: 970 PG/ML (ref 232–1245)
VITAMIN D 25-HYDROXY: 37.1 NG/ML
WBC # BLD: 6.1 K/UL (ref 3.5–11.3)

## 2022-10-20 PROCEDURE — A9579 GAD-BASE MR CONTRAST NOS,1ML: HCPCS | Performed by: NURSE PRACTITIONER

## 2022-10-20 PROCEDURE — 72158 MRI LUMBAR SPINE W/O & W/DYE: CPT

## 2022-10-20 PROCEDURE — 6360000004 HC RX CONTRAST MEDICATION: Performed by: NURSE PRACTITIONER

## 2022-10-20 PROCEDURE — 82565 ASSAY OF CREATININE: CPT

## 2022-10-20 RX ADMIN — GADOTERIDOL 15 ML: 279.3 INJECTION, SOLUTION INTRAVENOUS at 10:32

## 2022-11-11 ENCOUNTER — OFFICE VISIT (OUTPATIENT)
Dept: INTERNAL MEDICINE CLINIC | Age: 67
End: 2022-11-11
Payer: MEDICARE

## 2022-11-11 VITALS
HEART RATE: 75 BPM | OXYGEN SATURATION: 99 % | SYSTOLIC BLOOD PRESSURE: 106 MMHG | WEIGHT: 163.4 LBS | HEIGHT: 67 IN | TEMPERATURE: 98.6 F | RESPIRATION RATE: 20 BRPM | BODY MASS INDEX: 25.65 KG/M2 | DIASTOLIC BLOOD PRESSURE: 62 MMHG

## 2022-11-11 DIAGNOSIS — J45.20 MILD INTERMITTENT ASTHMA WITHOUT COMPLICATION: ICD-10-CM

## 2022-11-11 DIAGNOSIS — M51.36 DDD (DEGENERATIVE DISC DISEASE), LUMBAR: ICD-10-CM

## 2022-11-11 DIAGNOSIS — Z98.890 S/P LUMBAR MICRODISCECTOMY: ICD-10-CM

## 2022-11-11 DIAGNOSIS — E78.00 HIGH CHOLESTEROL: Primary | ICD-10-CM

## 2022-11-11 PROCEDURE — G8400 PT W/DXA NO RESULTS DOC: HCPCS | Performed by: INTERNAL MEDICINE

## 2022-11-11 PROCEDURE — G8484 FLU IMMUNIZE NO ADMIN: HCPCS | Performed by: INTERNAL MEDICINE

## 2022-11-11 PROCEDURE — 3017F COLORECTAL CA SCREEN DOC REV: CPT | Performed by: INTERNAL MEDICINE

## 2022-11-11 PROCEDURE — G8417 CALC BMI ABV UP PARAM F/U: HCPCS | Performed by: INTERNAL MEDICINE

## 2022-11-11 PROCEDURE — 1123F ACP DISCUSS/DSCN MKR DOCD: CPT | Performed by: INTERNAL MEDICINE

## 2022-11-11 PROCEDURE — G8427 DOCREV CUR MEDS BY ELIG CLIN: HCPCS | Performed by: INTERNAL MEDICINE

## 2022-11-11 PROCEDURE — 1090F PRES/ABSN URINE INCON ASSESS: CPT | Performed by: INTERNAL MEDICINE

## 2022-11-11 PROCEDURE — 1036F TOBACCO NON-USER: CPT | Performed by: INTERNAL MEDICINE

## 2022-11-11 PROCEDURE — 99214 OFFICE O/P EST MOD 30 MIN: CPT | Performed by: INTERNAL MEDICINE

## 2022-11-11 RX ORDER — FLUTICASONE PROPIONATE 110 UG/1
1 AEROSOL, METERED RESPIRATORY (INHALATION) 2 TIMES DAILY
Qty: 12 G | Refills: 0 | Status: SHIPPED | OUTPATIENT
Start: 2022-11-11

## 2022-11-11 SDOH — ECONOMIC STABILITY: FOOD INSECURITY: WITHIN THE PAST 12 MONTHS, THE FOOD YOU BOUGHT JUST DIDN'T LAST AND YOU DIDN'T HAVE MONEY TO GET MORE.: NEVER TRUE

## 2022-11-11 SDOH — ECONOMIC STABILITY: FOOD INSECURITY: WITHIN THE PAST 12 MONTHS, YOU WORRIED THAT YOUR FOOD WOULD RUN OUT BEFORE YOU GOT MONEY TO BUY MORE.: NEVER TRUE

## 2022-11-11 ASSESSMENT — PATIENT HEALTH QUESTIONNAIRE - PHQ9
1. LITTLE INTEREST OR PLEASURE IN DOING THINGS: 0
2. FEELING DOWN, DEPRESSED OR HOPELESS: 0
SUM OF ALL RESPONSES TO PHQ QUESTIONS 1-9: 0
SUM OF ALL RESPONSES TO PHQ9 QUESTIONS 1 & 2: 0

## 2022-11-11 ASSESSMENT — SOCIAL DETERMINANTS OF HEALTH (SDOH): HOW HARD IS IT FOR YOU TO PAY FOR THE VERY BASICS LIKE FOOD, HOUSING, MEDICAL CARE, AND HEATING?: NOT HARD AT ALL

## 2022-11-11 NOTE — PROGRESS NOTES
Mark Gaspar is a 78 y.o. female who presents for   Chief Complaint   Patient presents with    Cholesterol Problem     6 month follow up    Discuss Labs     Patient had MRI in Epic    Discuss Medications     Qvar inhaler is very expensive    and follow up of chronic medical problems. Patient Active Problem List   Diagnosis    IBS (irritable bowel syndrome)    Panic disorder    Chest pain    Lumbar disc herniation    Left lumbar radiculitis    DDD (degenerative disc disease), lumbar     HPI  Here for follow-up on cholesterol and back pain denies any new complaints and wants to discuss about inhalers    Current Outpatient Medications   Medication Sig Dispense Refill    fluticasone (FLOVENT HFA) 110 MCG/ACT inhaler Inhale 1 puff into the lungs 2 times daily 12 g 0    Red Yeast Rice Extract (RED YEAST RICE PO) Take by mouth daily      ELDERBERRY PO Take by mouth daily      lidocaine (LIDODERM) 5 % Place 1 patch onto the skin daily      busPIRone (BUSPAR) 5 MG tablet Take 5 mg by mouth 3 times daily as needed      acetaminophen (TYLENOL) 500 MG tablet Take 500 mg by mouth every 6 hours as needed for Pain      MAGNESIUM PO Take by mouth      Multiple Vitamins-Minerals (THERAPEUTIC MULTIVITAMIN-MINERALS) tablet Take 1 tablet by mouth daily       Ascorbic Acid (VITAMIN C) 250 MG tablet Take 250 mg by mouth daily       Coenzyme Q10 (CO Q 10 PO) Take by mouth       Cholecalciferol (VITAMIN D3) 30 MCG/15ML LIQD Take by mouth       fluticasone (FLONASE) 50 MCG/ACT nasal spray USE 1 SPRAY NASALLY DAILY 1 Bottle 5    Cetirizine HCl (ZYRTEC ALLERGY PO) Take 1 tablet by mouth daily        No current facility-administered medications for this visit.        Allergies   Allergen Reactions    Pcn [Penicillins]     Sulfa Antibiotics        Past Medical History:   Diagnosis Date    Asthma     DDD (degenerative disc disease), lumbar 7/14/2021    Hyperlipidemia     IBS (irritable bowel syndrome)     Left lumbar radiculitis 7/14/2021 Panic disorder        Past Surgical History:   Procedure Laterality Date    COLONOSCOPY      CYST INCISION AND DRAINAGE Right 11/1997    ENDOSCOPY, COLON, DIAGNOSTIC      NASAL POLYP SURGERY      OTHER SURGICAL HISTORY  08/02/2021    steroid injection    PAIN MANAGEMENT PROCEDURE Left 8/2/2021    LEFT L4 AND L5 EPIDURAL STEROID INJECTION performed by Violette Kearns MD at [address] History   Problem Relation Age of Onset    High Blood Pressure Mother     High Blood Pressure Father     Cancer Father         melanoma    Heart Disease Maternal Grandfather      ROS  Constitutional:  Negative for fatigue, loss of appetite and unexpected weight change  HEENT            : Negative for neck stiffness and pain, no congestion or sinus pressure  Eyes                : No visual disturbance or pain  Cardiovascular: No chest pain or palpitations or leg swelling  Respiratory      : Negative for cough, shortness of breath or wheezing  Gastrointestinal: Negative for abdominal pain, constipation or diarrhea and bloating No nausea or vomiting  Genitourinary:     No urgency or frequency, no burning or hematuria  Musculoskeletal: No arthralgias, back pain or myalgias  Skin                  : Negative for rash or erythema  Neurological    : Negative for dizziness, weakness, tremors ,light headedness or syncope  Psychiatric       : Negative for dysphoric mood, sleep disturbances, nervous or anxious, or decreased concentration  All other review of systems was negative    Objective  Physical Examination:    Nursing note reviewed    /62 (Site: Right Upper Arm, Position: Sitting, Cuff Size: Medium Adult)   Pulse 75   Temp 98.6 °F (37 °C) (Temporal)   Resp 20   Ht 5' 7.01\" (1.702 m)   Wt 163 lb 6.4 oz (74.1 kg)   LMP 01/01/2001 (LMP Unknown)   SpO2 99%   BMI 25.59 kg/m²   BP Readings from Last 3 Encounters:   11/11/22 106/62   05/09/22 114/74   11/09/21 92/62         Constitutional:  Edel Cord is oriented to place, person and time ,appears well-developed and well-nourished  HEENT:  Atraumatic and normocephalic, external ears normal bilaterally, nose normal no oropharyngeal exudate and is clear and moist  Eyes:  EOCM normal; conjunctivae normal; PERRLA bilaterally  Neck:  Normal range of motion, neck supple, no JVD and no thyromegaly  Cardiovascular:  RRR, normal heart sounds and intact distal pulses  Pulmonary:  effort normal and breath sounds normal bilaterally,no wheezes or rales, no respiratory distress  Abdominal:  Soft, non-tender; normal bowel sounds, no masses  Musculoskeletal:  Normal range of motion and no edema or tenderness bilaterally  No lymphadenopathy  Neurological:  alert, oriented, and normal reflexes bilaterally  Skin: warm and dry  Psychiatric:  normal mood and effect; behavior normal.    Labs:   No results found for: LABA1C  Lab Results   Component Value Date    CHOL 227 (H) 10/20/2022     Lab Results   Component Value Date    HDL 58 10/20/2022     Lab Results   Component Value Date    LDLCALC 128 11/16/2020     Lab Results   Component Value Date    TRIG 93 10/20/2022     No results found for: [place], [place]  Lab Results   Component Value Date    WBC 6.1 10/20/2022    HGB 13.1 10/20/2022    HCT 40.6 10/20/2022    MCV 96.9 10/20/2022     10/20/2022     Lab Results   Component Value Date    INR 0.9 01/03/2018    PROTIME 9.7 01/03/2018     Lab Results   Component Value Date    GLUCOSE 102 (H) 10/20/2022    CREATININE 0.7 10/20/2022    BUN 11 10/20/2022     10/20/2022    K 5.1 10/20/2022     10/20/2022    CO2 26 10/20/2022     Lab Results   Component Value Date    ALT 16 10/20/2022    AST 15 10/20/2022    ALKPHOS 69 10/20/2022    BILITOT 0.3 10/20/2022     Lab Results   Component Value Date    LABALBU 3.7 10/20/2022     Lab Results   Component Value Date    TSH 3.00 10/20/2022     Assessment:  1. High cholesterol    2. Mild intermittent asthma without complication    3. S/P lumbar microdiscectomy    4.  DDD (degenerative disc disease), lumbar        Plan:  Patient's cholesterol profile reviewed and LDL went up to 1 daily again and I did advise patient to go back on Lipitor and she agreed and will take twice daily and will repeat lab work at next visit  Patient's MRI done by neurosurgery reviewed which showed scar tissue and degenerative disc disease and explained to the patient and patient currently doing well without any significant pain that can limit her activities of daily living  Patient is using Qvar and it is expensive and now has insurance would not cover and we did change it to Flovent and asked him to check with insurance and call me back  Review in 6 months           1. Leona Marquez received counseling on the following healthy behaviors: nutrition and exercise    2. Prior labs and health maintenance reviewed. 3.  Discussed use, benefit, and side effects of prescribed medications. Barriers to medication compliance addressed. All her questions were answered. Pt voiced understanding. Leona Marquez will continue current medications, diet and exercise. Orders Placed This Encounter   Medications    fluticasone (FLOVENT HFA) 110 MCG/ACT inhaler     Sig: Inhale 1 puff into the lungs 2 times daily     Dispense:  12 g     Refill:  0          Completed Refills               Requested Prescriptions     Signed Prescriptions Disp Refills    fluticasone (FLOVENT HFA) 110 MCG/ACT inhaler 12 g 0     Sig: Inhale 1 puff into the lungs 2 times daily     4. Patient given educational materials - see patient instructions    5. Was a self-tracking handout given in paper form or via [employer]? NO    No orders of the defined types were placed in this encounter. No follow-ups on file. Patient voiced understanding and agreed to treatment plan.      Electronically signed by David Lazo MD on 11/11/2022 at 11:09 AM    This note is created with a voice recognition program and while intend to generate a document that accurately reflects the content of the visit, no guarantee can be provided that every mistake has been identified and corrected by editing.

## 2023-01-18 DIAGNOSIS — F41.9 ANXIETY: ICD-10-CM

## 2023-01-18 RX ORDER — ALPRAZOLAM 0.25 MG/1
0.25 TABLET ORAL 3 TIMES DAILY PRN
Qty: 5 TABLET | Refills: 0 | Status: SHIPPED | OUTPATIENT
Start: 2023-01-18 | End: 2023-01-23

## 2023-01-18 NOTE — TELEPHONE ENCOUNTER
Laura Prater is calling to request a refill on the following medication(s):    Medication Request:  Requested Prescriptions     Pending Prescriptions Disp Refills    ALPRAZolam (XANAX) 0.25 MG tablet 5 tablet 0     Sig: Take 1 tablet by mouth 3 times daily as needed for Anxiety for up to 5 days. Last Visit Date (If Applicable):  71/54/0414    Next Visit Date:    5/11/2023              Pt is going on a flight and is requesting Xanax please.

## 2023-01-19 RX ORDER — ATORVASTATIN CALCIUM 10 MG/1
TABLET, FILM COATED ORAL
Qty: 22 TABLET | Refills: 2 | Status: SHIPPED | OUTPATIENT
Start: 2023-01-19

## 2023-01-19 NOTE — TELEPHONE ENCOUNTER
Bebeto Thomas is calling to request a refill on the following medication(s):    Medication Request:  Requested Prescriptions     Pending Prescriptions Disp Refills    atorvastatin (LIPITOR) 10 MG tablet [Pharmacy Med Name: ATORVASTATIN 10 MG TABLET] 22 tablet 2     Sig: TAKE ONE TABLET BY MOUTH TWICE A WEEK       Last Visit Date (If Applicable):  81/36/2249    Next Visit Date:    5/11/2023

## 2023-03-23 ENCOUNTER — TELEPHONE (OUTPATIENT)
Dept: INTERNAL MEDICINE CLINIC | Age: 68
End: 2023-03-23

## 2023-03-23 DIAGNOSIS — R06.83 SNORING: Primary | ICD-10-CM

## 2023-03-28 ENCOUNTER — TELEPHONE (OUTPATIENT)
Dept: INTERNAL MEDICINE CLINIC | Age: 68
End: 2023-03-28

## 2023-03-28 DIAGNOSIS — R06.83 SNORING: Primary | ICD-10-CM

## 2023-03-28 NOTE — TELEPHONE ENCOUNTER
Patient requested referral sent to Dr. Alessandro Angelo at [address] [place] [address] sleep medicine

## 2023-06-06 ENCOUNTER — OFFICE VISIT (OUTPATIENT)
Dept: INTERNAL MEDICINE CLINIC | Age: 68
End: 2023-06-06
Payer: MEDICARE

## 2023-06-06 VITALS
SYSTOLIC BLOOD PRESSURE: 120 MMHG | DIASTOLIC BLOOD PRESSURE: 80 MMHG | WEIGHT: 164.8 LBS | HEIGHT: 67 IN | BODY MASS INDEX: 25.87 KG/M2 | OXYGEN SATURATION: 99 % | RESPIRATION RATE: 18 BRPM | TEMPERATURE: 98 F | HEART RATE: 74 BPM

## 2023-06-06 DIAGNOSIS — E55.9 VITAMIN D DEFICIENCY: ICD-10-CM

## 2023-06-06 DIAGNOSIS — J45.20 MILD INTERMITTENT ASTHMA WITHOUT COMPLICATION: ICD-10-CM

## 2023-06-06 DIAGNOSIS — E78.00 HIGH CHOLESTEROL: Primary | ICD-10-CM

## 2023-06-06 PROCEDURE — G0444 DEPRESSION SCREEN ANNUAL: HCPCS | Performed by: INTERNAL MEDICINE

## 2023-06-06 PROCEDURE — 99214 OFFICE O/P EST MOD 30 MIN: CPT | Performed by: INTERNAL MEDICINE

## 2023-06-06 PROCEDURE — G8400 PT W/DXA NO RESULTS DOC: HCPCS | Performed by: INTERNAL MEDICINE

## 2023-06-06 PROCEDURE — 1123F ACP DISCUSS/DSCN MKR DOCD: CPT | Performed by: INTERNAL MEDICINE

## 2023-06-06 PROCEDURE — G8417 CALC BMI ABV UP PARAM F/U: HCPCS | Performed by: INTERNAL MEDICINE

## 2023-06-06 PROCEDURE — 1090F PRES/ABSN URINE INCON ASSESS: CPT | Performed by: INTERNAL MEDICINE

## 2023-06-06 PROCEDURE — 3017F COLORECTAL CA SCREEN DOC REV: CPT | Performed by: INTERNAL MEDICINE

## 2023-06-06 PROCEDURE — 1036F TOBACCO NON-USER: CPT | Performed by: INTERNAL MEDICINE

## 2023-06-06 PROCEDURE — G8427 DOCREV CUR MEDS BY ELIG CLIN: HCPCS | Performed by: INTERNAL MEDICINE

## 2023-06-06 RX ORDER — BECLOMETHASONE DIPROPIONATE HFA 80 UG/1
1 AEROSOL, METERED RESPIRATORY (INHALATION) 2 TIMES DAILY
Qty: 1 EACH | Refills: 2 | Status: SHIPPED | OUTPATIENT
Start: 2023-06-06

## 2023-06-06 SDOH — ECONOMIC STABILITY: INCOME INSECURITY: HOW HARD IS IT FOR YOU TO PAY FOR THE VERY BASICS LIKE FOOD, HOUSING, MEDICAL CARE, AND HEATING?: NOT HARD AT ALL

## 2023-06-06 SDOH — ECONOMIC STABILITY: FOOD INSECURITY: WITHIN THE PAST 12 MONTHS, THE FOOD YOU BOUGHT JUST DIDN'T LAST AND YOU DIDN'T HAVE MONEY TO GET MORE.: NEVER TRUE

## 2023-06-06 SDOH — ECONOMIC STABILITY: HOUSING INSECURITY
IN THE LAST 12 MONTHS, WAS THERE A TIME WHEN YOU DID NOT HAVE A STEADY PLACE TO SLEEP OR SLEPT IN A SHELTER (INCLUDING NOW)?: NO

## 2023-06-06 SDOH — ECONOMIC STABILITY: FOOD INSECURITY: WITHIN THE PAST 12 MONTHS, YOU WORRIED THAT YOUR FOOD WOULD RUN OUT BEFORE YOU GOT MONEY TO BUY MORE.: NEVER TRUE

## 2023-06-06 NOTE — PROGRESS NOTES
and agreed to treatment plan. Electronically signed by Senait Clark MD on 6/6/2023 at 11:41 AM    This note is created with a voice recognition program and while intend to generate a document that accurately reflects the content of the visit, no guarantee can be provided that every mistake has been identified and corrected by editing.

## 2023-06-07 LAB
CHOLESTEROL, TOTAL: 208 MG/DL
CHOLESTEROL/HDL RATIO: 3.5
HDLC SERPL-MCNC: 59 MG/DL (ref 35–70)
LDL CHOLESTEROL CALCULATED: 131 MG/DL (ref 0–160)
NONHDLC SERPL-MCNC: ABNORMAL MG/DL
TRIGL SERPL-MCNC: 88 MG/DL
VLDLC SERPL CALC-MCNC: 18 MG/DL

## 2023-06-08 DIAGNOSIS — E78.00 HIGH CHOLESTEROL: ICD-10-CM

## 2023-06-08 DIAGNOSIS — J45.20 MILD INTERMITTENT ASTHMA WITHOUT COMPLICATION: ICD-10-CM

## 2023-06-08 DIAGNOSIS — E55.9 VITAMIN D DEFICIENCY: ICD-10-CM

## 2023-06-22 ENCOUNTER — HOSPITAL ENCOUNTER (OUTPATIENT)
Dept: MAMMOGRAPHY | Age: 68
Discharge: HOME | End: 2023-06-24
Payer: MEDICARE

## 2023-06-22 DIAGNOSIS — Z12.31 ENCOUNTER FOR SCREENING MAMMOGRAM FOR MALIGNANT NEOPLASM OF BREAST: ICD-10-CM

## 2023-06-22 PROCEDURE — 77063 BREAST TOMOSYNTHESIS BI: CPT

## 2023-06-28 ENCOUNTER — TELEPHONE (OUTPATIENT)
Dept: INTERNAL MEDICINE CLINIC | Age: 68
End: 2023-06-28

## 2023-06-28 DIAGNOSIS — R92.8 ABNORMAL MAMMOGRAM OF LEFT BREAST: Primary | ICD-10-CM

## 2023-06-30 ENCOUNTER — TELEPHONE (OUTPATIENT)
Dept: INTERNAL MEDICINE CLINIC | Age: 68
End: 2023-06-30

## 2023-06-30 DIAGNOSIS — R92.8 ABNORMAL MAMMOGRAM OF LEFT BREAST: Primary | ICD-10-CM

## 2023-07-03 ENCOUNTER — TELEPHONE (OUTPATIENT)
Dept: INTERNAL MEDICINE CLINIC | Age: 68
End: 2023-07-03

## 2023-07-03 DIAGNOSIS — R92.8 ABNORMAL MAMMOGRAM OF LEFT BREAST: Primary | ICD-10-CM

## 2023-07-03 NOTE — TELEPHONE ENCOUNTER
[facility] sent a fax over the mammogram order stating that breast US are done as 'US breast limited'. Asking for us to send a new order and fax order back to [phone] as US BREAST LIMITED LEFT. Order pending, okay to sign and fax over?

## 2023-07-17 ENCOUNTER — TELEPHONE (OUTPATIENT)
Dept: INTERNAL MEDICINE CLINIC | Age: 68
End: 2023-07-17

## 2023-07-17 DIAGNOSIS — R92.8 ABNORMAL MAMMOGRAM OF LEFT BREAST: Primary | ICD-10-CM

## 2023-07-17 DIAGNOSIS — Z12.31 ENCOUNTER FOR SCREENING MAMMOGRAM FOR HIGH-RISK PATIENT: ICD-10-CM

## 2023-07-17 DIAGNOSIS — N63.20 MASS OF LEFT BREAST, UNSPECIFIED QUADRANT: ICD-10-CM

## 2023-07-17 NOTE — TELEPHONE ENCOUNTER
[facility] asking for a new order for a uni left breast mammogram?    Patient has an appointment tomorrow

## 2023-08-11 ENCOUNTER — TELEPHONE (OUTPATIENT)
Dept: INTERNAL MEDICINE CLINIC | Age: 68
End: 2023-08-11

## 2023-08-11 NOTE — TELEPHONE ENCOUNTER
Pain on right side abdomen having some pain and cramping (intermediate)  Unable to have a bowel movement  Going on since yesterday  Tuesday patient had diarrhea  Patient is refusing ED and urgent care    Please advise

## 2023-11-21 RX ORDER — ATORVASTATIN CALCIUM 10 MG/1
TABLET, FILM COATED ORAL
Qty: 24 TABLET | Refills: 1 | Status: SHIPPED | OUTPATIENT
Start: 2023-11-21

## 2023-11-21 NOTE — TELEPHONE ENCOUNTER
Crystal Frames is calling to request a refill on the following medication(s):    Medication Request:  Requested Prescriptions     Pending Prescriptions Disp Refills    atorvastatin (LIPITOR) 10 MG tablet [Pharmacy Med Name: ATORVASTATIN 10 MG TABLET] 22 tablet 2     Sig: TAKE ONE TABLET BY MOUTH TWICE A WEEK       Last Visit Date (If Applicable):  8/0/5556    Next Visit Date:    12/5/2023      Last refill 1/19/23. Prescription pending.

## 2023-12-05 ENCOUNTER — OFFICE VISIT (OUTPATIENT)
Dept: INTERNAL MEDICINE CLINIC | Age: 68
End: 2023-12-05
Payer: MEDICARE

## 2023-12-05 VITALS
WEIGHT: 170.4 LBS | TEMPERATURE: 98.2 F | HEIGHT: 67 IN | BODY MASS INDEX: 26.74 KG/M2 | SYSTOLIC BLOOD PRESSURE: 120 MMHG | DIASTOLIC BLOOD PRESSURE: 70 MMHG | HEART RATE: 70 BPM | OXYGEN SATURATION: 100 %

## 2023-12-05 DIAGNOSIS — Z00.00 INITIAL MEDICARE ANNUAL WELLNESS VISIT: Primary | ICD-10-CM

## 2023-12-05 DIAGNOSIS — Z23 NEED FOR PROPHYLACTIC VACCINATION AGAINST DIPHTHERIA-TETANUS-PERTUSSIS (DTP): ICD-10-CM

## 2023-12-05 DIAGNOSIS — Z11.59 NEED FOR HEPATITIS C SCREENING TEST: ICD-10-CM

## 2023-12-05 DIAGNOSIS — Z78.0 ASYMPTOMATIC MENOPAUSAL STATE: ICD-10-CM

## 2023-12-05 DIAGNOSIS — E78.00 HIGH CHOLESTEROL: ICD-10-CM

## 2023-12-05 DIAGNOSIS — Z72.89 OTHER PROBLEMS RELATED TO LIFESTYLE: ICD-10-CM

## 2023-12-05 PROCEDURE — G0438 PPPS, INITIAL VISIT: HCPCS | Performed by: INTERNAL MEDICINE

## 2023-12-05 PROCEDURE — 1123F ACP DISCUSS/DSCN MKR DOCD: CPT | Performed by: INTERNAL MEDICINE

## 2023-12-05 PROCEDURE — 3017F COLORECTAL CA SCREEN DOC REV: CPT | Performed by: INTERNAL MEDICINE

## 2023-12-05 PROCEDURE — G8484 FLU IMMUNIZE NO ADMIN: HCPCS | Performed by: INTERNAL MEDICINE

## 2023-12-05 RX ORDER — BUSPIRONE HYDROCHLORIDE 5 MG/1
5 TABLET ORAL 3 TIMES DAILY PRN
Qty: 90 TABLET | Refills: 0 | Status: SHIPPED | OUTPATIENT
Start: 2023-12-05

## 2023-12-05 ASSESSMENT — PATIENT HEALTH QUESTIONNAIRE - PHQ9
2. FEELING DOWN, DEPRESSED OR HOPELESS: 0
SUM OF ALL RESPONSES TO PHQ QUESTIONS 1-9: 0
SUM OF ALL RESPONSES TO PHQ9 QUESTIONS 1 & 2: 0
1. LITTLE INTEREST OR PLEASURE IN DOING THINGS: 0
SUM OF ALL RESPONSES TO PHQ QUESTIONS 1-9: 0

## 2023-12-05 ASSESSMENT — LIFESTYLE VARIABLES
HOW OFTEN DO YOU HAVE A DRINK CONTAINING ALCOHOL: 2-3 TIMES A WEEK
HOW MANY STANDARD DRINKS CONTAINING ALCOHOL DO YOU HAVE ON A TYPICAL DAY: 1 OR 2

## 2023-12-05 NOTE — PATIENT INSTRUCTIONS
questions about a medical condition or this instruction, always ask your healthcare professional. [address] any warranty or liability for your use of this information. Personalized Preventive Plan for Mhai Smith - 12/5/2023  Medicare offers a range of preventive health benefits. Some of the tests and screenings are paid in full while other may be subject to a deductible, co-insurance, and/or copay. Some of these benefits include a comprehensive review of your medical history including lifestyle, illnesses that may run in your family, and various assessments and screenings as appropriate. After reviewing your medical record and screening and assessments performed today your provider may have ordered immunizations, labs, imaging, and/or referrals for you. A list of these orders (if applicable) as well as your Preventive Care list are included within your After Visit Summary for your review. Other Preventive Recommendations:    A preventive eye exam performed by an eye specialist is recommended every 1-2 years to screen for glaucoma; cataracts, macular degeneration, and other eye disorders. A preventive dental visit is recommended every 6 months. Try to get at least 150 minutes of exercise per week or 10,000 steps per day on a pedometer . Order or download the FREE \"Exercise & Physical Activity: Your Everyday Guide\" from [employer]. Call 5-966.244.2561 or search [employer]  [employer] online. You need 5634-2648 mg of calcium and 9257-7157 IU of vitamin D per day. It is possible to meet your calcium requirement with diet alone, but a vitamin D supplement is usually necessary to meet this goal.  When exposed to the sun, use a sunscreen that protects against both UVA and UVB radiation with an SPF of 30 or greater. Reapply every 2 to 3 hours or after sweating, drying off with a towel, or swimming.   Always wear a seat belt when traveling in a

## 2023-12-05 NOTE — PROGRESS NOTES
Medicare Annual Wellness Visit    Lorena Meyers is here for Medicare AWV, Health Maintenance (Hep c, dexa, tdap, rsv, d screen, awv), and Discuss Medications (Patient states she is no longer taking the Atorvastatin due to leg pain)    Assessment & Plan   Initial Medicare annual wellness visit  Asymptomatic menopausal state  -     DEXA Bone Density Axial Skeleton; Future  Need for hepatitis C screening test  -     Hepatitis C Antibody; Future  Need for prophylactic vaccination against diphtheria-tetanus-pertussis (DTP)  -     Tdap (ADACEL) 5-2-15.5 LF-MCG/0.5 injection; Inject 0.5 mLs into the muscle once for 1 dose, Disp-0.5 mL, R-0Print  Other problems related to lifestyle  -     Hepatitis C Antibody; Future  Recommendations for Preventive Services Due: see orders and patient instructions/AVS.  Recommended screening schedule for the next 5-10 years is provided to the patient in written form: see Patient Instructions/AVS.     Return in 6 months (on 6/5/2024). Subjective   Discussed about the Lipitor and patient stopped it because she was getting some leg pains and pharmacy advised her to hold it and I explained to her that it may not be related to it but advised her to continue to hold it for 1 month and repeat lab work and call me back if symptoms improved or not and we can try a different statin and currently patient was taking Lipitor only twice a week  Discussed about Prevnar 20, RSV and Tdap    Patient's complete Health Risk Assessment and screening values have been reviewed and are found in [address] The following problems were reviewed today and where indicated follow up appointments were made and/or referrals ordered.     Positive Risk Factor Screenings with Interventions:    Fall Risk:  Do you feel unsteady or are you worried about falling? : no  2 or more falls in past year?: (!) yes  Fall with injury in past year?: (!) yes     Interventions:    Patient declines any further evaluation or treatment

## 2024-01-03 ENCOUNTER — TELEPHONE (OUTPATIENT)
Dept: INTERNAL MEDICINE CLINIC | Age: 69
End: 2024-01-03

## 2024-01-04 ENCOUNTER — HOSPITAL ENCOUNTER (OUTPATIENT)
Dept: MRI IMAGING | Facility: CLINIC | Age: 69
Discharge: HOME | End: 2024-01-06
Payer: MEDICARE

## 2024-01-04 DIAGNOSIS — M25.552 LEFT HIP PAIN: ICD-10-CM

## 2024-01-04 DIAGNOSIS — Z98.890 S/P VERTEBROPLASTY: ICD-10-CM

## 2024-01-04 DIAGNOSIS — M54.41 ACUTE RIGHT-SIDED LOW BACK PAIN WITH RIGHT-SIDED SCIATICA: ICD-10-CM

## 2024-01-04 DIAGNOSIS — Z91.81 HISTORY OF FALL: ICD-10-CM

## 2024-01-04 PROCEDURE — 72148 MRI LUMBAR SPINE W/O DYE: CPT

## 2024-01-11 NOTE — TELEPHONE ENCOUNTER
Patient would like her Qvar to be changed to the generic of Symbicort due to the cost. States it is about 100.00 dollars less. Please advise.       She would the medication sent to [facility] on [address]

## 2024-01-12 RX ORDER — BUDESONIDE AND FORMOTEROL FUMARATE DIHYDRATE 160; 4.5 UG/1; UG/1
2 AEROSOL RESPIRATORY (INHALATION) 2 TIMES DAILY
Qty: 30.6 G | Refills: 1 | Status: SHIPPED | OUTPATIENT
Start: 2024-01-12

## 2024-02-26 LAB
BILIRUBIN URINE: 0 MG/DL
BLOOD, URINE: POSITIVE
CLARITY: CLEAR
COLOR: YELLOW
GLUCOSE URINE: NORMAL
KETONES, URINE: NEGATIVE
LEUKOCYTE ESTERASE, URINE: NEGATIVE
NITRITE, URINE: NEGATIVE
PH UA: 6.5 (ref 4.5–8)
PROTEIN UA: NEGATIVE
SPECIFIC GRAVITY UA: 1.01 (ref 1–1.03)
UROBILINOGEN, URINE: NORMAL

## 2024-02-27 ENCOUNTER — TELEPHONE (OUTPATIENT)
Dept: INTERNAL MEDICINE CLINIC | Age: 69
End: 2024-02-27

## 2024-02-27 DIAGNOSIS — N39.0 URINARY TRACT INFECTION WITHOUT HEMATURIA, SITE UNSPECIFIED: Primary | ICD-10-CM

## 2024-02-28 DIAGNOSIS — N39.0 URINARY TRACT INFECTION WITHOUT HEMATURIA, SITE UNSPECIFIED: ICD-10-CM

## 2024-02-29 ENCOUNTER — TELEPHONE (OUTPATIENT)
Dept: INTERNAL MEDICINE CLINIC | Age: 69
End: 2024-02-29

## 2024-02-29 RX ORDER — PHENAZOPYRIDINE HYDROCHLORIDE 100 MG/1
100 TABLET, FILM COATED ORAL 3 TIMES DAILY PRN
Qty: 9 TABLET | Refills: 0 | Status: SHIPPED | OUTPATIENT
Start: 2024-02-29 | End: 2024-03-03

## 2024-02-29 NOTE — TELEPHONE ENCOUNTER
Patient informed   Has GYN apt 5/13/24 no sooner appt available for new patient  Patient is asking if there is any she can take until appt wit GYN

## 2024-02-29 NOTE — TELEPHONE ENCOUNTER
----- Message from Herbie BEVERLY MD sent at 2/28/2024  4:20 PM EST -----  Urine did not show any infection and burning sensation may be due to vaginal dryness  Advise her to follow-up with OB/GYN and/or urology

## 2024-10-07 ENCOUNTER — TELEPHONE (OUTPATIENT)
Dept: OTHER | Facility: CLINIC | Age: 69
End: 2024-10-07

## (undated) DEVICE — 1010 S-DRAPE TOWEL DRAPE 10/BX: Brand: STERI-DRAPE™

## (undated) DEVICE — NEEDLE SPNL L4.5IN OD22GA QNCKE TYP SPINOCAN

## (undated) DEVICE — APPLICATOR MEDICATED 10.5 CC SOLUTION HI LT ORNG CHLORAPREP

## (undated) DEVICE — GLOVE SURG SZ 75 CRM LTX FREE POLYISOPRENE POLYMER BEAD ANTI

## (undated) DEVICE — EXTENSION SET IV 12 IN 0.45 CC INFUSION MINIBOR TBNG CLMP

## (undated) DEVICE — TOWEL,OR,DSP,ST,BLUE,DLX,XR,4/PK,20PK/CS: Brand: MEDLINE

## (undated) DEVICE — SINGLE DOSE EPI TY